# Patient Record
Sex: MALE | Race: WHITE | HISPANIC OR LATINO | Employment: UNEMPLOYED | URBAN - METROPOLITAN AREA
[De-identification: names, ages, dates, MRNs, and addresses within clinical notes are randomized per-mention and may not be internally consistent; named-entity substitution may affect disease eponyms.]

---

## 2022-11-12 ENCOUNTER — APPOINTMENT (OUTPATIENT)
Dept: RADIOLOGY | Facility: MEDICAL CENTER | Age: 47
DRG: 281 | End: 2022-11-12
Attending: EMERGENCY MEDICINE

## 2022-11-12 ENCOUNTER — APPOINTMENT (OUTPATIENT)
Dept: RADIOLOGY | Facility: MEDICAL CENTER | Age: 47
DRG: 281 | End: 2022-11-12
Attending: INTERNAL MEDICINE

## 2022-11-12 ENCOUNTER — HOSPITAL ENCOUNTER (INPATIENT)
Facility: MEDICAL CENTER | Age: 47
LOS: 6 days | DRG: 281 | End: 2022-11-18
Attending: EMERGENCY MEDICINE | Admitting: INTERNAL MEDICINE

## 2022-11-12 DIAGNOSIS — G61.0 GUILLAIN-BARRE SYNDROME (HCC): ICD-10-CM

## 2022-11-12 PROBLEM — I21.4 NSTEMI (NON-ST ELEVATED MYOCARDIAL INFARCTION) (HCC): Status: ACTIVE | Noted: 2022-11-12

## 2022-11-12 LAB
ALBUMIN SERPL BCP-MCNC: 5.1 G/DL (ref 3.2–4.9)
ALBUMIN/GLOB SERPL: 2 G/DL
ALP SERPL-CCNC: 143 U/L (ref 30–99)
ALT SERPL-CCNC: 80 U/L (ref 2–50)
ANION GAP SERPL CALC-SCNC: 13 MMOL/L (ref 7–16)
APTT PPP: 30.2 SEC (ref 24.7–36)
AST SERPL-CCNC: 61 U/L (ref 12–45)
BASOPHILS # BLD AUTO: 0.2 % (ref 0–1.8)
BASOPHILS # BLD: 0.01 K/UL (ref 0–0.12)
BILIRUB SERPL-MCNC: 0.3 MG/DL (ref 0.1–1.5)
BUN SERPL-MCNC: 23 MG/DL (ref 8–22)
CALCIUM SERPL-MCNC: 9.2 MG/DL (ref 8.5–10.5)
CHLORIDE SERPL-SCNC: 100 MMOL/L (ref 96–112)
CO2 SERPL-SCNC: 25 MMOL/L (ref 20–33)
CREAT SERPL-MCNC: 0.95 MG/DL (ref 0.5–1.4)
D DIMER PPP IA.FEU-MCNC: 0.65 UG/ML (FEU) (ref 0–0.5)
EKG IMPRESSION: NORMAL
EOSINOPHIL # BLD AUTO: 0.01 K/UL (ref 0–0.51)
EOSINOPHIL NFR BLD: 0.2 % (ref 0–6.9)
ERYTHROCYTE [DISTWIDTH] IN BLOOD BY AUTOMATED COUNT: 51.3 FL (ref 35.9–50)
EST. AVERAGE GLUCOSE BLD GHB EST-MCNC: 111 MG/DL
GFR SERPLBLD CREATININE-BSD FMLA CKD-EPI: 99 ML/MIN/1.73 M 2
GLOBULIN SER CALC-MCNC: 2.6 G/DL (ref 1.9–3.5)
GLUCOSE SERPL-MCNC: 102 MG/DL (ref 65–99)
HBA1C MFR BLD: 5.5 % (ref 4–5.6)
HCT VFR BLD AUTO: 40 % (ref 42–52)
HGB BLD-MCNC: 13.7 G/DL (ref 14–18)
IMM GRANULOCYTES # BLD AUTO: 0.01 K/UL (ref 0–0.11)
IMM GRANULOCYTES NFR BLD AUTO: 0.2 % (ref 0–0.9)
INR PPP: 1.01 (ref 0.87–1.13)
LYMPHOCYTES # BLD AUTO: 0.68 K/UL (ref 1–4.8)
LYMPHOCYTES NFR BLD: 13.4 % (ref 22–41)
MCH RBC QN AUTO: 36 PG (ref 27–33)
MCHC RBC AUTO-ENTMCNC: 34.3 G/DL (ref 33.7–35.3)
MCV RBC AUTO: 105 FL (ref 81.4–97.8)
MONOCYTES # BLD AUTO: 0.31 K/UL (ref 0–0.85)
MONOCYTES NFR BLD AUTO: 6.1 % (ref 0–13.4)
NEUTROPHILS # BLD AUTO: 4.04 K/UL (ref 1.82–7.42)
NEUTROPHILS NFR BLD: 79.9 % (ref 44–72)
NRBC # BLD AUTO: 0 K/UL
NRBC BLD-RTO: 0 /100 WBC
PLATELET # BLD AUTO: 127 K/UL (ref 164–446)
PMV BLD AUTO: 10.3 FL (ref 9–12.9)
POTASSIUM SERPL-SCNC: 3.8 MMOL/L (ref 3.6–5.5)
PROT SERPL-MCNC: 7.7 G/DL (ref 6–8.2)
PROTHROMBIN TIME: 13.2 SEC (ref 12–14.6)
RBC # BLD AUTO: 3.81 M/UL (ref 4.7–6.1)
SODIUM SERPL-SCNC: 138 MMOL/L (ref 135–145)
T4 FREE SERPL-MCNC: 1.16 NG/DL (ref 0.93–1.7)
TROPONIN T SERPL-MCNC: 54 NG/L (ref 6–19)
TROPONIN T SERPL-MCNC: 57 NG/L (ref 6–19)
TROPONIN T SERPL-MCNC: 57 NG/L (ref 6–19)
TSH SERPL DL<=0.005 MIU/L-ACNC: 13.9 UIU/ML (ref 0.38–5.33)
VIT B12 SERPL-MCNC: 369 PG/ML (ref 211–911)
WBC # BLD AUTO: 5.1 K/UL (ref 4.8–10.8)

## 2022-11-12 PROCEDURE — 700111 HCHG RX REV CODE 636 W/ 250 OVERRIDE (IP): Performed by: INTERNAL MEDICINE

## 2022-11-12 PROCEDURE — 76705 ECHO EXAM OF ABDOMEN: CPT

## 2022-11-12 PROCEDURE — 99285 EMERGENCY DEPT VISIT HI MDM: CPT

## 2022-11-12 PROCEDURE — 700111 HCHG RX REV CODE 636 W/ 250 OVERRIDE (IP): Performed by: EMERGENCY MEDICINE

## 2022-11-12 PROCEDURE — A9270 NON-COVERED ITEM OR SERVICE: HCPCS | Performed by: INTERNAL MEDICINE

## 2022-11-12 PROCEDURE — 96376 TX/PRO/DX INJ SAME DRUG ADON: CPT

## 2022-11-12 PROCEDURE — 700102 HCHG RX REV CODE 250 W/ 637 OVERRIDE(OP): Performed by: INTERNAL MEDICINE

## 2022-11-12 PROCEDURE — 84484 ASSAY OF TROPONIN QUANT: CPT

## 2022-11-12 PROCEDURE — 96374 THER/PROPH/DIAG INJ IV PUSH: CPT

## 2022-11-12 PROCEDURE — 84439 ASSAY OF FREE THYROXINE: CPT

## 2022-11-12 PROCEDURE — 84443 ASSAY THYROID STIM HORMONE: CPT

## 2022-11-12 PROCEDURE — 85730 THROMBOPLASTIN TIME PARTIAL: CPT

## 2022-11-12 PROCEDURE — 770020 HCHG ROOM/CARE - TELE (206)

## 2022-11-12 PROCEDURE — 85610 PROTHROMBIN TIME: CPT

## 2022-11-12 PROCEDURE — 85025 COMPLETE CBC W/AUTO DIFF WBC: CPT

## 2022-11-12 PROCEDURE — 93005 ELECTROCARDIOGRAM TRACING: CPT | Performed by: EMERGENCY MEDICINE

## 2022-11-12 PROCEDURE — 80053 COMPREHEN METABOLIC PANEL: CPT

## 2022-11-12 PROCEDURE — 82607 VITAMIN B-12: CPT

## 2022-11-12 PROCEDURE — 36415 COLL VENOUS BLD VENIPUNCTURE: CPT

## 2022-11-12 PROCEDURE — 99223 1ST HOSP IP/OBS HIGH 75: CPT | Performed by: INTERNAL MEDICINE

## 2022-11-12 PROCEDURE — 85379 FIBRIN DEGRADATION QUANT: CPT

## 2022-11-12 PROCEDURE — 71045 X-RAY EXAM CHEST 1 VIEW: CPT

## 2022-11-12 PROCEDURE — 83036 HEMOGLOBIN GLYCOSYLATED A1C: CPT

## 2022-11-12 RX ORDER — CLOPIDOGREL BISULFATE 75 MG/1
75 TABLET ORAL DAILY
Status: DISCONTINUED | OUTPATIENT
Start: 2022-11-13 | End: 2022-11-18 | Stop reason: HOSPADM

## 2022-11-12 RX ORDER — ATORVASTATIN CALCIUM 40 MG/1
40 TABLET, FILM COATED ORAL EVERY EVENING
Status: DISCONTINUED | OUTPATIENT
Start: 2022-11-12 | End: 2022-11-15

## 2022-11-12 RX ORDER — AMOXICILLIN 250 MG
2 CAPSULE ORAL 2 TIMES DAILY
Status: DISCONTINUED | OUTPATIENT
Start: 2022-11-12 | End: 2022-11-18 | Stop reason: HOSPADM

## 2022-11-12 RX ORDER — LEVOTHYROXINE SODIUM 0.12 MG/1
125 TABLET ORAL
Status: DISCONTINUED | OUTPATIENT
Start: 2022-11-13 | End: 2022-11-18 | Stop reason: HOSPADM

## 2022-11-12 RX ORDER — MORPHINE SULFATE 4 MG/ML
4 INJECTION INTRAVENOUS ONCE
Status: COMPLETED | OUTPATIENT
Start: 2022-11-12 | End: 2022-11-12

## 2022-11-12 RX ORDER — ONDANSETRON 2 MG/ML
4 INJECTION INTRAMUSCULAR; INTRAVENOUS EVERY 4 HOURS PRN
Status: DISCONTINUED | OUTPATIENT
Start: 2022-11-12 | End: 2022-11-18 | Stop reason: HOSPADM

## 2022-11-12 RX ORDER — ENOXAPARIN SODIUM 100 MG/ML
60 INJECTION SUBCUTANEOUS EVERY 12 HOURS
Status: DISCONTINUED | OUTPATIENT
Start: 2022-11-12 | End: 2022-11-13

## 2022-11-12 RX ORDER — BISACODYL 10 MG
10 SUPPOSITORY, RECTAL RECTAL
Status: DISCONTINUED | OUTPATIENT
Start: 2022-11-12 | End: 2022-11-18 | Stop reason: HOSPADM

## 2022-11-12 RX ORDER — FUROSEMIDE 10 MG/ML
20 INJECTION INTRAMUSCULAR; INTRAVENOUS ONCE
Status: COMPLETED | OUTPATIENT
Start: 2022-11-12 | End: 2022-11-12

## 2022-11-12 RX ORDER — PROMETHAZINE HYDROCHLORIDE 25 MG/1
12.5-25 SUPPOSITORY RECTAL EVERY 4 HOURS PRN
Status: DISCONTINUED | OUTPATIENT
Start: 2022-11-12 | End: 2022-11-18 | Stop reason: HOSPADM

## 2022-11-12 RX ORDER — PROCHLORPERAZINE EDISYLATE 5 MG/ML
5-10 INJECTION INTRAMUSCULAR; INTRAVENOUS EVERY 4 HOURS PRN
Status: DISCONTINUED | OUTPATIENT
Start: 2022-11-12 | End: 2022-11-18 | Stop reason: HOSPADM

## 2022-11-12 RX ORDER — ONDANSETRON 4 MG/1
4 TABLET, ORALLY DISINTEGRATING ORAL EVERY 4 HOURS PRN
Status: DISCONTINUED | OUTPATIENT
Start: 2022-11-12 | End: 2022-11-18 | Stop reason: HOSPADM

## 2022-11-12 RX ORDER — PROMETHAZINE HYDROCHLORIDE 25 MG/1
12.5-25 TABLET ORAL EVERY 4 HOURS PRN
Status: DISCONTINUED | OUTPATIENT
Start: 2022-11-12 | End: 2022-11-18 | Stop reason: HOSPADM

## 2022-11-12 RX ORDER — POLYETHYLENE GLYCOL 3350 17 G/17G
1 POWDER, FOR SOLUTION ORAL
Status: DISCONTINUED | OUTPATIENT
Start: 2022-11-12 | End: 2022-11-18 | Stop reason: HOSPADM

## 2022-11-12 RX ORDER — LISINOPRIL 5 MG/1
5 TABLET ORAL
Status: DISCONTINUED | OUTPATIENT
Start: 2022-11-13 | End: 2022-11-13

## 2022-11-12 RX ADMIN — MORPHINE SULFATE 4 MG: 4 INJECTION INTRAVENOUS at 20:26

## 2022-11-12 RX ADMIN — SENNOSIDES AND DOCUSATE SODIUM 2 TABLET: 50; 8.6 TABLET ORAL at 23:22

## 2022-11-12 RX ADMIN — FUROSEMIDE 20 MG: 10 INJECTION, SOLUTION INTRAMUSCULAR; INTRAVENOUS at 23:23

## 2022-11-12 RX ADMIN — MORPHINE SULFATE 4 MG: 4 INJECTION INTRAVENOUS at 16:53

## 2022-11-12 RX ADMIN — ATORVASTATIN CALCIUM 40 MG: 40 TABLET, FILM COATED ORAL at 23:23

## 2022-11-12 RX ADMIN — ENOXAPARIN SODIUM 60 MG: 60 INJECTION SUBCUTANEOUS at 23:22

## 2022-11-12 RX ADMIN — METOPROLOL TARTRATE 12.5 MG: 25 TABLET, FILM COATED ORAL at 23:22

## 2022-11-12 ASSESSMENT — ENCOUNTER SYMPTOMS
PHOTOPHOBIA: 0
SPEECH CHANGE: 0
CLAUDICATION: 0
COUGH: 0
MYALGIAS: 0
NAUSEA: 0
DOUBLE VISION: 0
DIARRHEA: 0
ORTHOPNEA: 0
PALPITATIONS: 0
NECK PAIN: 0
WEIGHT LOSS: 0
VOMITING: 0
DIZZINESS: 0
HEMOPTYSIS: 0
BLURRED VISION: 0
CONSTIPATION: 0
CHILLS: 0
ABDOMINAL PAIN: 0
FEVER: 0
WEAKNESS: 0

## 2022-11-12 ASSESSMENT — LIFESTYLE VARIABLES: DO YOU DRINK ALCOHOL: NO

## 2022-11-12 NOTE — ED PROVIDER NOTES
ED Provider Note    CHIEF COMPLAINT  Chief Complaint   Patient presents with    Chest Pain     X 2 hours. Came in by ems. EMS gave 324 asa pta       HPI  Kristopher Leroy is a 46 y.o. male who presents with 2 hours of chest pain starting while he was walking with his walker.  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  The patient has a rather complex medical history as well including 3 open heart surgeries and multiple valvular replacements done at [facility].  He also notes that he has had prior history of cardiac catheterization though he is uncertain if any procedures were performed during catheterization such as stenting.  He states that he was taking a train to [facility] where he is residing now from [place].  He has stopped by in [place] since yesterday.  Notes that while he was walking with his walker, he had sudden chest pain.  No vomiting or diaphoresis.  Has mild shortness of breath.  No recent fevers or illness.    Incidentally, he is also requesting a new walker as his is no longer functioning appropriately.    He is on chronic medications for his multiple heart conditions.  He states that he has been instructed to change his doses however he has not yet given that he is unable to read.    REVIEW OF SYSTEMS  See HPI for further details. All other systems are negative.     PAST MEDICAL HISTORY       SOCIAL HISTORY  Social History     Tobacco Use    Smoking status: Not on file    Smokeless tobacco: Not on file   Substance and Sexual Activity    Alcohol use: Not on file    Drug use: Not on file    Sexual activity: Not on file       SURGICAL HISTORY  patient denies any surgical history    CURRENT MEDICATIONS  Home Medications    **Home medications have not yet been reviewed for this encounter**         ALLERGIES  No Known Allergies    PHYSICAL EXAM  VITAL SIGNS: BP (!) 143/85   Pulse 84   Resp 18   Ht 1.651 m (5'  "5\")   Wt 72.6 kg (160 lb)   SpO2 100%   BMI 26.63 kg/m²   Pulse ox interpretation: I interpret this pulse ox as normal.  Constitutional: Alert in no apparent distress.  HENT: No signs of trauma, Bilateral external ears normal, Nose normal.   Eyes: Pupils are equal and reactive, Conjunctiva normal, Non-icteric.   Neck: Normal range of motion, Supple, No stridor.   Cardiovascular: Regular rate and rhythm.   Thorax & Lungs: Normal breath sounds, No respiratory distress, No wheezing, No chest tenderness.   Abdomen: Soft, No tenderness, No masses, No pulsatile masses. No peritoneal signs.  Skin: Warm, Dry, No erythema, No rash.   Back: No bony tenderness, No CVA tenderness.   Extremities: Intact distal pulses, No edema, No tenderness, No cyanosis  Musculoskeletal:  No major deformities noted.   Neurologic: Alert, chronic extremity weakness and contractures to bilateral hands no focal deficits noted.       DIAGNOSTIC STUDIES / PROCEDURES    EKG - Physician interpretation  Results for orders placed or performed during the hospital encounter of 22   EKG   Result Value Ref Range    Report       [facility] Emergency Dept.    Test Date:  2022  Pt Name:    KATTY HOLLAND           Department: ER  MRN:        [id number]                      Room:        14  Gender:     Male                         Technician: [id number]  :        1975                   Requested By:CHARITY POSADA  Order #:    [id number]                    Reading MD:    Measurements  Intervals                                Axis  Rate:       77                           P:          44  NH:         166                          QRS:        112  QRSD:       109                          T:          -19  QT:         403  QTc:        457    Interpretive Statements  Sinus rhythm  Ventricular premature complex  Probable left atrial enlargement  Left posterior fascicular block  Nonspecific T abnormalities, lateral leads  Borderline " ST elevation, anterior leads  No previous ECG available for comparison     EKG   Result Value Ref Range    Report       [facility] Emergency Dept.    Test Date:  2022  Pt Name:    KATTY HOLLAND           Department: ER  MRN:        [id number]                      Room:       BL 14  Gender:     Male                         Technician: [id number]  :        1975                   Requested By:CHARITY POSADA  Order #:    [id number]                    Reading MD:    Measurements  Intervals                                Axis  Rate:       81                           P:          10  MO:         229                          QRS:        104  QRSD:       113                          T:          -40  QT:         393  QTc:        457    Interpretive Statements  Sinus rhythm  Prolonged MO interval  Probable left atrial enlargement  Borderline intraventricular conduction delay  Nonspecific T abnormalities, lateral leads  Compared to ECG 2022 16:05:01  First degree AV block now present  Ventricular premature complex(es) no longer present  Left posterior fascicular block no longer pre sent  ST (T wave) deviation no longer present  T-wave abnormality still present           LABS  Labs Reviewed   CBC WITH DIFFERENTIAL - Abnormal; Notable for the following components:       Result Value    RBC 3.81 (*)     Hemoglobin 13.7 (*)     Hematocrit 40.0 (*)     .0 (*)     MCH 36.0 (*)     RDW 51.3 (*)     Platelet Count 127 (*)     Neutrophils-Polys 79.90 (*)     Lymphocytes 13.40 (*)     Lymphs (Absolute) 0.68 (*)     All other components within normal limits    Narrative:     Biotin intake of greater than 5 mg per day may interfere with  troponin levels, causing false low values.   COMP METABOLIC PANEL - Abnormal; Notable for the following components:    Glucose 102 (*)     Bun 23 (*)     AST(SGOT) 61 (*)     ALT(SGPT) 80 (*)     Alkaline Phosphatase 143 (*)     Albumin 5.1 (*)     All other  components within normal limits    Narrative:     Biotin intake of greater than 5 mg per day may interfere with  troponin levels, causing false low values.   TROPONIN - Abnormal; Notable for the following components:    Troponin T 57 (*)     All other components within normal limits    Narrative:     Biotin intake of greater than 5 mg per day may interfere with  troponin levels, causing false low values.   TROPONIN - Abnormal; Notable for the following components:    Troponin T 54 (*)     All other components within normal limits    Narrative:     Biotin intake of greater than 5 mg per day may interfere with  troponin levels, causing false low values.   TSH WITH REFLEX TO FT4 - Abnormal; Notable for the following components:    TSH 13.900 (*)     All other components within normal limits   D-DIMER - Abnormal; Notable for the following components:    D-Dimer Screen 0.65 (*)     All other components within normal limits   TROPONIN - Abnormal; Notable for the following components:    Troponin T 57 (*)     All other components within normal limits    Narrative:     Biotin intake of greater than 5 mg per day may interfere with  troponin levels, causing false low values.   PROTHROMBIN TIME    Narrative:     Biotin intake of greater than 5 mg per day may interfere with  troponin levels, causing false low values.   APTT    Narrative:     Biotin intake of greater than 5 mg per day may interfere with  troponin levels, causing false low values.   ESTIMATED GFR    Narrative:     Biotin intake of greater than 5 mg per day may interfere with  troponin levels, causing false low values.   VITAMIN B12   HEMOGLOBIN A1C   FREE THYROXINE   CBC WITH DIFFERENTIAL   COMP METABOLIC PANEL   MAGNESIUM   LIPID PROFILE   HEPATITIS PANEL ACUTE(4 COMPONENTS)   TROPONIN   TROPONIN         RADIOLOGY  US-RUQ   Final Result      1. Status post cholecystectomy. The common bile duct is obscured by overlying bowel gas.   2. Homogeneous appearance of  the liver with no hepatic mass.      DX-CHEST-PORTABLE (1 VIEW)   Final Result      1.  Mildly enlarged cardiac silhouette with vascular congestion.      EC-ECHOCARDIOGRAM COMPLETE W/O CONT    (Results Pending)   CT-CTA CHEST PULMONARY ARTERY W/ RECONS    (Results Pending)         COURSE & MEDICAL DECISION MAKING    Medications   senna-docusate (PERICOLACE or SENOKOT S) 8.6-50 MG per tablet 2 Tablet (2 Tablets Oral Given 11/12/22 2322)     And   polyethylene glycol/lytes (MIRALAX) PACKET 1 Packet (has no administration in time range)     And   magnesium hydroxide (MILK OF MAGNESIA) suspension 30 mL (has no administration in time range)     And   bisacodyl (DULCOLAX) suppository 10 mg (has no administration in time range)   enoxaparin (Lovenox) inj 60 mg (60 mg Subcutaneous Given 11/12/22 2322)   clopidogrel (PLAVIX) tablet 75 mg (has no administration in time range)   metoprolol tartrate (LOPRESSOR) tablet 12.5 mg (12.5 mg Oral Given 11/12/22 2322)   lisinopril (PRINIVIL) tablet 5 mg (has no administration in time range)   ondansetron (ZOFRAN) syringe/vial injection 4 mg (has no administration in time range)   ondansetron (ZOFRAN ODT) dispertab 4 mg (has no administration in time range)   promethazine (PHENERGAN) tablet 12.5-25 mg (has no administration in time range)   promethazine (PHENERGAN) suppository 12.5-25 mg (has no administration in time range)   prochlorperazine (COMPAZINE) injection 5-10 mg (has no administration in time range)   atorvastatin (LIPITOR) tablet 40 mg (40 mg Oral Given 11/12/22 2323)   levothyroxine (SYNTHROID) tablet 125 mcg (has no administration in time range)   morphine 4 MG/ML injection 4 mg (4 mg Intravenous Given 11/12/22 1653)   morphine 4 MG/ML injection 4 mg (4 mg Intravenous Given 11/12/22 2026)   furosemide (LASIX) injection 20 mg (20 mg Intravenous Given 11/12/22 2323)       Pertinent Labs & Imaging studies reviewed. (See chart for details)  46 y.o. male presenting with diffuse  "chest pain that started about 2 hours prior to arrival.  He states that he is currently on his way to [place] from [facility].  He has longstanding cardiac issues though is uncertain which ones.  Incidentally, he also has a history of Guillain-Barré with chronic extremity weakness.  He ambulates with a walker -he states that his walker is currently broken and he would like another 1.  He states that all of his care has been in [facility].  He has had multiple open heart surgeries and cardiac catheterizations without a clear understanding of his diagnoses.  We did attempt several times to obtain medical records from [place].  Unfortunately, there were technical difficulties with regards to faxing medical records requests to and from [facility].  Staff here had been in contact with [place] throughout the day and tried several times to obtain medical records without success.      Patient does have abnormal EKG with lateral T wave inversions and slight elevation in troponin.  Continues to have chest pain.  Given the unclear underlying cardiac issues that the patient has, recommending hospitalization for further work-up and stabilization while continuing to try to attempt to obtain outside medical records.  Patient states that he has had similar chest pain symptoms in the past that did not require any invasive interventions though he is uncertain why he had chest pain in the past.  Overall, the patient is a rather poor medical historian and the patient's underlying medical issues are unknown at this point.    I did speak with the hospitalist service and they are agreeable to the hospitalization.    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

## 2022-11-13 ENCOUNTER — APPOINTMENT (OUTPATIENT)
Dept: CARDIOLOGY | Facility: MEDICAL CENTER | Age: 47
DRG: 281 | End: 2022-11-13
Attending: STUDENT IN AN ORGANIZED HEALTH CARE EDUCATION/TRAINING PROGRAM

## 2022-11-13 ENCOUNTER — APPOINTMENT (OUTPATIENT)
Dept: RADIOLOGY | Facility: MEDICAL CENTER | Age: 47
DRG: 281 | End: 2022-11-13
Attending: INTERNAL MEDICINE

## 2022-11-13 ENCOUNTER — APPOINTMENT (OUTPATIENT)
Dept: CARDIOLOGY | Facility: MEDICAL CENTER | Age: 47
DRG: 281 | End: 2022-11-13
Attending: INTERNAL MEDICINE

## 2022-11-13 PROBLEM — D53.9 MACROCYTIC ANEMIA: Status: ACTIVE | Noted: 2022-11-13

## 2022-11-13 PROBLEM — R07.9 CHEST PAIN: Status: ACTIVE | Noted: 2022-11-13

## 2022-11-13 PROBLEM — F10.10 ALCOHOL CONSUMPTION BINGE DRINKING: Status: ACTIVE | Noted: 2022-11-13

## 2022-11-13 LAB
ALBUMIN SERPL BCP-MCNC: 4.3 G/DL (ref 3.2–4.9)
ALBUMIN SERPL BCP-MCNC: 4.5 G/DL (ref 3.2–4.9)
ALBUMIN/GLOB SERPL: 1.9 G/DL
ALBUMIN/GLOB SERPL: 2 G/DL
ALP SERPL-CCNC: 108 U/L (ref 30–99)
ALP SERPL-CCNC: 116 U/L (ref 30–99)
ALT SERPL-CCNC: 63 U/L (ref 2–50)
ALT SERPL-CCNC: 64 U/L (ref 2–50)
AMPHET UR QL SCN: NEGATIVE
ANION GAP SERPL CALC-SCNC: 10 MMOL/L (ref 7–16)
ANION GAP SERPL CALC-SCNC: 12 MMOL/L (ref 7–16)
APTT PPP: 42.5 SEC (ref 24.7–36)
AST SERPL-CCNC: 47 U/L (ref 12–45)
AST SERPL-CCNC: 49 U/L (ref 12–45)
BARBITURATES UR QL SCN: NEGATIVE
BASOPHILS # BLD AUTO: 0.2 % (ref 0–1.8)
BASOPHILS # BLD: 0.01 K/UL (ref 0–0.12)
BENZODIAZ UR QL SCN: POSITIVE
BILIRUB SERPL-MCNC: 0.3 MG/DL (ref 0.1–1.5)
BILIRUB SERPL-MCNC: 0.5 MG/DL (ref 0.1–1.5)
BUN SERPL-MCNC: 20 MG/DL (ref 8–22)
BUN SERPL-MCNC: 21 MG/DL (ref 8–22)
BZE UR QL SCN: NEGATIVE
CALCIUM SERPL-MCNC: 8.6 MG/DL (ref 8.5–10.5)
CALCIUM SERPL-MCNC: 8.6 MG/DL (ref 8.5–10.5)
CANNABINOIDS UR QL SCN: POSITIVE
CHLORIDE SERPL-SCNC: 100 MMOL/L (ref 96–112)
CHLORIDE SERPL-SCNC: 102 MMOL/L (ref 96–112)
CHOLEST SERPL-MCNC: 114 MG/DL (ref 100–199)
CO2 SERPL-SCNC: 25 MMOL/L (ref 20–33)
CO2 SERPL-SCNC: 26 MMOL/L (ref 20–33)
CREAT SERPL-MCNC: 0.57 MG/DL (ref 0.5–1.4)
CREAT SERPL-MCNC: 0.75 MG/DL (ref 0.5–1.4)
CRP SERPL HS-MCNC: <0.3 MG/DL (ref 0–0.75)
EKG IMPRESSION: NORMAL
EKG IMPRESSION: NORMAL
EOSINOPHIL # BLD AUTO: 0.01 K/UL (ref 0–0.51)
EOSINOPHIL NFR BLD: 0.2 % (ref 0–6.9)
ERYTHROCYTE [DISTWIDTH] IN BLOOD BY AUTOMATED COUNT: 51.4 FL (ref 35.9–50)
ERYTHROCYTE [DISTWIDTH] IN BLOOD BY AUTOMATED COUNT: 51.6 FL (ref 35.9–50)
ERYTHROCYTE [SEDIMENTATION RATE] IN BLOOD BY WESTERGREN METHOD: 6 MM/HOUR (ref 0–20)
GFR SERPLBLD CREATININE-BSD FMLA CKD-EPI: 111 ML/MIN/1.73 M 2
GFR SERPLBLD CREATININE-BSD FMLA CKD-EPI: 121 ML/MIN/1.73 M 2
GLOBULIN SER CALC-MCNC: 2.3 G/DL (ref 1.9–3.5)
GLOBULIN SER CALC-MCNC: 2.3 G/DL (ref 1.9–3.5)
GLUCOSE SERPL-MCNC: 104 MG/DL (ref 65–99)
GLUCOSE SERPL-MCNC: 134 MG/DL (ref 65–99)
HAV IGM SERPL QL IA: NORMAL
HBV CORE IGM SER QL: NORMAL
HBV SURFACE AG SER QL: NORMAL
HCT VFR BLD AUTO: 36.7 % (ref 42–52)
HCT VFR BLD AUTO: 38.2 % (ref 42–52)
HCV AB SER QL: NORMAL
HDLC SERPL-MCNC: 57 MG/DL
HGB BLD-MCNC: 12.4 G/DL (ref 14–18)
HGB BLD-MCNC: 12.9 G/DL (ref 14–18)
IMM GRANULOCYTES # BLD AUTO: 0.02 K/UL (ref 0–0.11)
IMM GRANULOCYTES NFR BLD AUTO: 0.5 % (ref 0–0.9)
INR PPP: 1.1 (ref 0.87–1.13)
LDLC SERPL CALC-MCNC: 51 MG/DL
LIPASE SERPL-CCNC: 46 U/L (ref 11–82)
LV EJECT FRACT  99904: 20
LV EJECT FRACT  99904: 20
LV EJECT FRACT MOD 2C 99903: 28.8
LV EJECT FRACT MOD 2C 99903: 28.8
LYMPHOCYTES # BLD AUTO: 0.8 K/UL (ref 1–4.8)
LYMPHOCYTES NFR BLD: 19.7 % (ref 22–41)
MAGNESIUM SERPL-MCNC: 1.9 MG/DL (ref 1.5–2.5)
MCH RBC QN AUTO: 35.2 PG (ref 27–33)
MCH RBC QN AUTO: 35.2 PG (ref 27–33)
MCHC RBC AUTO-ENTMCNC: 33.8 G/DL (ref 33.7–35.3)
MCHC RBC AUTO-ENTMCNC: 33.8 G/DL (ref 33.7–35.3)
MCV RBC AUTO: 104.3 FL (ref 81.4–97.8)
MCV RBC AUTO: 104.4 FL (ref 81.4–97.8)
METHADONE UR QL SCN: NEGATIVE
MONOCYTES # BLD AUTO: 0.31 K/UL (ref 0–0.85)
MONOCYTES NFR BLD AUTO: 7.6 % (ref 0–13.4)
NEUTROPHILS # BLD AUTO: 2.92 K/UL (ref 1.82–7.42)
NEUTROPHILS NFR BLD: 71.8 % (ref 44–72)
NRBC # BLD AUTO: 0 K/UL
NRBC BLD-RTO: 0 /100 WBC
OPIATES UR QL SCN: POSITIVE
OXYCODONE UR QL SCN: NEGATIVE
PCP UR QL SCN: NEGATIVE
PLATELET # BLD AUTO: 111 K/UL (ref 164–446)
PLATELET # BLD AUTO: 115 K/UL (ref 164–446)
PMV BLD AUTO: 10.3 FL (ref 9–12.9)
PMV BLD AUTO: 10.9 FL (ref 9–12.9)
POTASSIUM SERPL-SCNC: 3.8 MMOL/L (ref 3.6–5.5)
POTASSIUM SERPL-SCNC: 3.9 MMOL/L (ref 3.6–5.5)
PROPOXYPH UR QL SCN: NEGATIVE
PROT SERPL-MCNC: 6.6 G/DL (ref 6–8.2)
PROT SERPL-MCNC: 6.8 G/DL (ref 6–8.2)
PROTHROMBIN TIME: 14.1 SEC (ref 12–14.6)
RBC # BLD AUTO: 3.52 M/UL (ref 4.7–6.1)
RBC # BLD AUTO: 3.66 M/UL (ref 4.7–6.1)
SODIUM SERPL-SCNC: 137 MMOL/L (ref 135–145)
SODIUM SERPL-SCNC: 138 MMOL/L (ref 135–145)
TRIGL SERPL-MCNC: 31 MG/DL (ref 0–149)
TROPONIN T SERPL-MCNC: 57 NG/L (ref 6–19)
TROPONIN T SERPL-MCNC: 64 NG/L (ref 6–19)
UFH PPP CHRO-ACNC: 0.72 IU/ML
WBC # BLD AUTO: 4.1 K/UL (ref 4.8–10.8)
WBC # BLD AUTO: 4.7 K/UL (ref 4.8–10.8)

## 2022-11-13 PROCEDURE — 700111 HCHG RX REV CODE 636 W/ 250 OVERRIDE (IP)

## 2022-11-13 PROCEDURE — 700105 HCHG RX REV CODE 258: Performed by: STUDENT IN AN ORGANIZED HEALTH CARE EDUCATION/TRAINING PROGRAM

## 2022-11-13 PROCEDURE — 770020 HCHG ROOM/CARE - TELE (206)

## 2022-11-13 PROCEDURE — 86140 C-REACTIVE PROTEIN: CPT

## 2022-11-13 PROCEDURE — 93306 TTE W/DOPPLER COMPLETE: CPT

## 2022-11-13 PROCEDURE — A9270 NON-COVERED ITEM OR SERVICE: HCPCS | Performed by: STUDENT IN AN ORGANIZED HEALTH CARE EDUCATION/TRAINING PROGRAM

## 2022-11-13 PROCEDURE — 80053 COMPREHEN METABOLIC PANEL: CPT

## 2022-11-13 PROCEDURE — 80307 DRUG TEST PRSMV CHEM ANLYZR: CPT

## 2022-11-13 PROCEDURE — 700102 HCHG RX REV CODE 250 W/ 637 OVERRIDE(OP)

## 2022-11-13 PROCEDURE — 700102 HCHG RX REV CODE 250 W/ 637 OVERRIDE(OP): Performed by: NURSE PRACTITIONER

## 2022-11-13 PROCEDURE — A9270 NON-COVERED ITEM OR SERVICE: HCPCS

## 2022-11-13 PROCEDURE — 93010 ELECTROCARDIOGRAM REPORT: CPT | Mod: 76 | Performed by: STUDENT IN AN ORGANIZED HEALTH CARE EDUCATION/TRAINING PROGRAM

## 2022-11-13 PROCEDURE — B2151ZZ FLUOROSCOPY OF LEFT HEART USING LOW OSMOLAR CONTRAST: ICD-10-PCS | Performed by: INTERNAL MEDICINE

## 2022-11-13 PROCEDURE — 93458 L HRT ARTERY/VENTRICLE ANGIO: CPT | Mod: 26 | Performed by: INTERNAL MEDICINE

## 2022-11-13 PROCEDURE — 93306 TTE W/DOPPLER COMPLETE: CPT | Mod: 26 | Performed by: INTERNAL MEDICINE

## 2022-11-13 PROCEDURE — 36415 COLL VENOUS BLD VENIPUNCTURE: CPT

## 2022-11-13 PROCEDURE — 85652 RBC SED RATE AUTOMATED: CPT

## 2022-11-13 PROCEDURE — 93567 NJX CAR CTH SPRVLV AORTGRPHY: CPT | Performed by: INTERNAL MEDICINE

## 2022-11-13 PROCEDURE — 4A023N7 MEASUREMENT OF CARDIAC SAMPLING AND PRESSURE, LEFT HEART, PERCUTANEOUS APPROACH: ICD-10-PCS | Performed by: INTERNAL MEDICINE

## 2022-11-13 PROCEDURE — 700117 HCHG RX CONTRAST REV CODE 255: Performed by: INTERNAL MEDICINE

## 2022-11-13 PROCEDURE — 93005 ELECTROCARDIOGRAM TRACING: CPT | Performed by: INTERNAL MEDICINE

## 2022-11-13 PROCEDURE — 84484 ASSAY OF TROPONIN QUANT: CPT

## 2022-11-13 PROCEDURE — B3101ZZ FLUOROSCOPY OF THORACIC AORTA USING LOW OSMOLAR CONTRAST: ICD-10-PCS | Performed by: INTERNAL MEDICINE

## 2022-11-13 PROCEDURE — 700101 HCHG RX REV CODE 250

## 2022-11-13 PROCEDURE — 700102 HCHG RX REV CODE 250 W/ 637 OVERRIDE(OP): Performed by: INTERNAL MEDICINE

## 2022-11-13 PROCEDURE — 85520 HEPARIN ASSAY: CPT

## 2022-11-13 PROCEDURE — 85025 COMPLETE CBC W/AUTO DIFF WBC: CPT

## 2022-11-13 PROCEDURE — C9803 HOPD COVID-19 SPEC COLLECT: HCPCS

## 2022-11-13 PROCEDURE — 700111 HCHG RX REV CODE 636 W/ 250 OVERRIDE (IP): Performed by: INTERNAL MEDICINE

## 2022-11-13 PROCEDURE — 99254 IP/OBS CNSLTJ NEW/EST MOD 60: CPT | Performed by: STUDENT IN AN ORGANIZED HEALTH CARE EDUCATION/TRAINING PROGRAM

## 2022-11-13 PROCEDURE — 700105 HCHG RX REV CODE 258: Performed by: INTERNAL MEDICINE

## 2022-11-13 PROCEDURE — 83690 ASSAY OF LIPASE: CPT

## 2022-11-13 PROCEDURE — 71275 CT ANGIOGRAPHY CHEST: CPT

## 2022-11-13 PROCEDURE — 99152 MOD SED SAME PHYS/QHP 5/>YRS: CPT | Performed by: INTERNAL MEDICINE

## 2022-11-13 PROCEDURE — 99153 MOD SED SAME PHYS/QHP EA: CPT

## 2022-11-13 PROCEDURE — 700102 HCHG RX REV CODE 250 W/ 637 OVERRIDE(OP): Performed by: STUDENT IN AN ORGANIZED HEALTH CARE EDUCATION/TRAINING PROGRAM

## 2022-11-13 PROCEDURE — 99233 SBSQ HOSP IP/OBS HIGH 50: CPT | Mod: GC | Performed by: INTERNAL MEDICINE

## 2022-11-13 PROCEDURE — 85027 COMPLETE CBC AUTOMATED: CPT

## 2022-11-13 PROCEDURE — 80074 ACUTE HEPATITIS PANEL: CPT

## 2022-11-13 PROCEDURE — 80061 LIPID PANEL: CPT

## 2022-11-13 PROCEDURE — 85730 THROMBOPLASTIN TIME PARTIAL: CPT

## 2022-11-13 PROCEDURE — A9270 NON-COVERED ITEM OR SERVICE: HCPCS | Performed by: NURSE PRACTITIONER

## 2022-11-13 PROCEDURE — A9270 NON-COVERED ITEM OR SERVICE: HCPCS | Performed by: INTERNAL MEDICINE

## 2022-11-13 PROCEDURE — 81003 URINALYSIS AUTO W/O SCOPE: CPT

## 2022-11-13 PROCEDURE — 85610 PROTHROMBIN TIME: CPT

## 2022-11-13 PROCEDURE — B2111ZZ FLUOROSCOPY OF MULTIPLE CORONARY ARTERIES USING LOW OSMOLAR CONTRAST: ICD-10-PCS | Performed by: INTERNAL MEDICINE

## 2022-11-13 PROCEDURE — 83735 ASSAY OF MAGNESIUM: CPT

## 2022-11-13 RX ORDER — ASPIRIN 81 MG/1
81 TABLET, CHEWABLE ORAL DAILY
Status: DISCONTINUED | OUTPATIENT
Start: 2022-11-14 | End: 2022-11-15

## 2022-11-13 RX ORDER — FUROSEMIDE 20 MG/1
20 TABLET ORAL 2 TIMES DAILY
COMMUNITY

## 2022-11-13 RX ORDER — MIDAZOLAM HYDROCHLORIDE 1 MG/ML
INJECTION INTRAMUSCULAR; INTRAVENOUS
Status: COMPLETED
Start: 2022-11-13 | End: 2022-11-13

## 2022-11-13 RX ORDER — HEPARIN SODIUM 1000 [USP'U]/ML
60 INJECTION, SOLUTION INTRAVENOUS; SUBCUTANEOUS ONCE
Status: DISCONTINUED | OUTPATIENT
Start: 2022-11-13 | End: 2022-11-13

## 2022-11-13 RX ORDER — SODIUM CHLORIDE 9 MG/ML
INJECTION, SOLUTION INTRAVENOUS CONTINUOUS
Status: DISCONTINUED | OUTPATIENT
Start: 2022-11-13 | End: 2022-11-13

## 2022-11-13 RX ORDER — MORPHINE SULFATE 4 MG/ML
2 INJECTION INTRAVENOUS ONCE
Status: COMPLETED | OUTPATIENT
Start: 2022-11-13 | End: 2022-11-13

## 2022-11-13 RX ORDER — CLOPIDOGREL BISULFATE 75 MG/1
75 TABLET ORAL DAILY
COMMUNITY

## 2022-11-13 RX ORDER — HEPARIN SODIUM 1000 [USP'U]/ML
30 INJECTION, SOLUTION INTRAVENOUS; SUBCUTANEOUS PRN
Status: DISCONTINUED | OUTPATIENT
Start: 2022-11-13 | End: 2022-11-13

## 2022-11-13 RX ORDER — TRAMADOL HYDROCHLORIDE 50 MG/1
50 TABLET ORAL EVERY 6 HOURS PRN
Status: DISCONTINUED | OUTPATIENT
Start: 2022-11-13 | End: 2022-11-18 | Stop reason: HOSPADM

## 2022-11-13 RX ORDER — ENOXAPARIN SODIUM 100 MG/ML
60 INJECTION SUBCUTANEOUS EVERY 12 HOURS
Status: DISCONTINUED | OUTPATIENT
Start: 2022-11-14 | End: 2022-11-13

## 2022-11-13 RX ORDER — HEPARIN SODIUM 5000 [USP'U]/100ML
0-30 INJECTION, SOLUTION INTRAVENOUS CONTINUOUS
Status: DISCONTINUED | OUTPATIENT
Start: 2022-11-13 | End: 2022-11-13

## 2022-11-13 RX ORDER — ASPIRIN 81 MG/1
81 TABLET, CHEWABLE ORAL DAILY
Status: DISCONTINUED | OUTPATIENT
Start: 2022-11-14 | End: 2022-11-13

## 2022-11-13 RX ORDER — METOPROLOL SUCCINATE 25 MG/1
12.5 TABLET, EXTENDED RELEASE ORAL DAILY
Status: DISCONTINUED | OUTPATIENT
Start: 2022-11-14 | End: 2022-11-13

## 2022-11-13 RX ORDER — LEVOTHYROXINE SODIUM 0.12 MG/1
125 TABLET ORAL
COMMUNITY

## 2022-11-13 RX ORDER — LISINOPRIL 2.5 MG/1
2.5 TABLET ORAL DAILY
Status: ON HOLD | COMMUNITY
End: 2022-11-18

## 2022-11-13 RX ORDER — ATORVASTATIN CALCIUM 80 MG/1
80 TABLET, FILM COATED ORAL NIGHTLY
COMMUNITY

## 2022-11-13 RX ORDER — ENOXAPARIN SODIUM 100 MG/ML
40 INJECTION SUBCUTANEOUS DAILY
Status: DISCONTINUED | OUTPATIENT
Start: 2022-11-14 | End: 2022-11-14

## 2022-11-13 RX ORDER — ACETAMINOPHEN 325 MG/1
650 TABLET ORAL EVERY 4 HOURS PRN
Status: DISCONTINUED | OUTPATIENT
Start: 2022-11-13 | End: 2022-11-13

## 2022-11-13 RX ORDER — VERAPAMIL HYDROCHLORIDE 2.5 MG/ML
INJECTION, SOLUTION INTRAVENOUS
Status: COMPLETED
Start: 2022-11-13 | End: 2022-11-13

## 2022-11-13 RX ORDER — FUROSEMIDE 20 MG/1
20 TABLET ORAL
Status: DISCONTINUED | OUTPATIENT
Start: 2022-11-13 | End: 2022-11-18 | Stop reason: HOSPADM

## 2022-11-13 RX ORDER — NITROGLYCERIN 20 MG/100ML
5 INJECTION INTRAVENOUS CONTINUOUS
Status: DISCONTINUED | OUTPATIENT
Start: 2022-11-13 | End: 2022-11-13

## 2022-11-13 RX ORDER — CHOLECALCIFEROL (VITAMIN D3) 125 MCG
5-10 CAPSULE ORAL NIGHTLY
Status: DISCONTINUED | OUTPATIENT
Start: 2022-11-13 | End: 2022-11-18 | Stop reason: HOSPADM

## 2022-11-13 RX ORDER — METOPROLOL SUCCINATE 25 MG/1
12.5 TABLET, EXTENDED RELEASE ORAL DAILY
Status: ON HOLD | COMMUNITY
End: 2022-11-18

## 2022-11-13 RX ORDER — ACETAMINOPHEN 325 MG/1
325 TABLET ORAL EVERY 6 HOURS PRN
Status: DISCONTINUED | OUTPATIENT
Start: 2022-11-13 | End: 2022-11-18 | Stop reason: HOSPADM

## 2022-11-13 RX ORDER — LIDOCAINE HYDROCHLORIDE 20 MG/ML
INJECTION, SOLUTION INFILTRATION; PERINEURAL
Status: COMPLETED
Start: 2022-11-13 | End: 2022-11-13

## 2022-11-13 RX ORDER — LISINOPRIL 5 MG/1
5 TABLET ORAL
Status: DISCONTINUED | OUTPATIENT
Start: 2022-11-14 | End: 2022-11-18 | Stop reason: HOSPADM

## 2022-11-13 RX ORDER — ASPIRIN 81 MG/1
TABLET, CHEWABLE ORAL
Status: COMPLETED
Start: 2022-11-13 | End: 2022-11-13

## 2022-11-13 RX ORDER — DIPHENHYDRAMINE HYDROCHLORIDE 50 MG/ML
INJECTION INTRAMUSCULAR; INTRAVENOUS
Status: COMPLETED
Start: 2022-11-13 | End: 2022-11-13

## 2022-11-13 RX ORDER — NITROGLYCERIN 0.1MG/HR
1 PATCH, TRANSDERMAL 24 HOURS TRANSDERMAL DAILY
Status: DISCONTINUED | OUTPATIENT
Start: 2022-11-13 | End: 2022-11-13

## 2022-11-13 RX ORDER — ASPIRIN 81 MG/1
81 TABLET, CHEWABLE ORAL DAILY
COMMUNITY

## 2022-11-13 RX ORDER — LIDOCAINE 50 MG/G
1 PATCH TOPICAL EVERY 24 HOURS
Status: DISCONTINUED | OUTPATIENT
Start: 2022-11-13 | End: 2022-11-18 | Stop reason: HOSPADM

## 2022-11-13 RX ORDER — LISINOPRIL 5 MG/1
2.5 TABLET ORAL
Status: DISCONTINUED | OUTPATIENT
Start: 2022-11-14 | End: 2022-11-13

## 2022-11-13 RX ORDER — HEPARIN SODIUM 1000 [USP'U]/ML
INJECTION, SOLUTION INTRAVENOUS; SUBCUTANEOUS
Status: COMPLETED
Start: 2022-11-13 | End: 2022-11-13

## 2022-11-13 RX ORDER — NITROGLYCERIN 0.4 MG/1
0.4 TABLET SUBLINGUAL
Status: DISCONTINUED | OUTPATIENT
Start: 2022-11-13 | End: 2022-11-18 | Stop reason: HOSPADM

## 2022-11-13 RX ORDER — HEPARIN SODIUM 200 [USP'U]/100ML
INJECTION, SOLUTION INTRAVENOUS
Status: COMPLETED
Start: 2022-11-13 | End: 2022-11-13

## 2022-11-13 RX ADMIN — ASPIRIN 81 MG 81 MG: 81 TABLET ORAL at 13:37

## 2022-11-13 RX ADMIN — HEPARIN SODIUM: 1000 INJECTION, SOLUTION INTRAVENOUS; SUBCUTANEOUS at 13:48

## 2022-11-13 RX ADMIN — ENOXAPARIN SODIUM 60 MG: 60 INJECTION SUBCUTANEOUS at 05:48

## 2022-11-13 RX ADMIN — LIDOCAINE 1 PATCH: 700 PATCH TOPICAL at 17:28

## 2022-11-13 RX ADMIN — NITROGLYCERIN 10 ML: 20 INJECTION INTRAVENOUS at 13:48

## 2022-11-13 RX ADMIN — IOHEXOL 131 ML: 350 INJECTION, SOLUTION INTRAVENOUS at 14:12

## 2022-11-13 RX ADMIN — NITROGLYCERIN 0.4 MG: 0.4 TABLET, ORALLY DISINTEGRATING SUBLINGUAL at 08:55

## 2022-11-13 RX ADMIN — FENTANYL CITRATE 25 MCG: 50 INJECTION INTRAMUSCULAR; INTRAVENOUS at 14:09

## 2022-11-13 RX ADMIN — CLOPIDOGREL BISULFATE 75 MG: 75 TABLET ORAL at 05:49

## 2022-11-13 RX ADMIN — NITROGLYCERIN 1 PATCH: 0.1 PATCH TRANSDERMAL at 11:30

## 2022-11-13 RX ADMIN — SODIUM CHLORIDE: 9 INJECTION, SOLUTION INTRAVENOUS at 10:54

## 2022-11-13 RX ADMIN — ATORVASTATIN CALCIUM 40 MG: 40 TABLET, FILM COATED ORAL at 17:28

## 2022-11-13 RX ADMIN — SODIUM CHLORIDE: 9 INJECTION, SOLUTION INTRAVENOUS at 14:50

## 2022-11-13 RX ADMIN — MIDAZOLAM HYDROCHLORIDE 1 MG: 1 INJECTION, SOLUTION INTRAMUSCULAR; INTRAVENOUS at 14:10

## 2022-11-13 RX ADMIN — MORPHINE SULFATE 2 MG: 4 INJECTION INTRAVENOUS at 09:37

## 2022-11-13 RX ADMIN — IOHEXOL 65 ML: 350 INJECTION, SOLUTION INTRAVENOUS at 03:00

## 2022-11-13 RX ADMIN — METOPROLOL TARTRATE 12.5 MG: 25 TABLET, FILM COATED ORAL at 05:50

## 2022-11-13 RX ADMIN — TRAMADOL HYDROCHLORIDE 50 MG: 50 TABLET, COATED ORAL at 19:50

## 2022-11-13 RX ADMIN — LEVOTHYROXINE SODIUM 125 MCG: 0.12 TABLET ORAL at 05:49

## 2022-11-13 RX ADMIN — LIDOCAINE HYDROCHLORIDE: 20 INJECTION, SOLUTION INFILTRATION; PERINEURAL at 13:47

## 2022-11-13 RX ADMIN — Medication 10 MG: at 21:27

## 2022-11-13 RX ADMIN — ACETAMINOPHEN 650 MG: 325 TABLET, FILM COATED ORAL at 00:45

## 2022-11-13 RX ADMIN — DIPHENHYDRAMINE HYDROCHLORIDE 50 MG: 50 INJECTION INTRAMUSCULAR; INTRAVENOUS at 14:01

## 2022-11-13 RX ADMIN — FUROSEMIDE 20 MG: 20 TABLET ORAL at 17:33

## 2022-11-13 RX ADMIN — SENNOSIDES AND DOCUSATE SODIUM 2 TABLET: 50; 8.6 TABLET ORAL at 05:49

## 2022-11-13 RX ADMIN — METOPROLOL TARTRATE 12.5 MG: 25 TABLET, FILM COATED ORAL at 17:33

## 2022-11-13 RX ADMIN — THIAMINE HYDROCHLORIDE 100 MG: 100 INJECTION, SOLUTION INTRAMUSCULAR; INTRAVENOUS at 11:08

## 2022-11-13 RX ADMIN — VERAPAMIL HYDROCHLORIDE 2.5 MG: 2.5 INJECTION, SOLUTION INTRAVENOUS at 13:48

## 2022-11-13 RX ADMIN — LISINOPRIL 5 MG: 5 TABLET ORAL at 05:49

## 2022-11-13 RX ADMIN — HEPARIN SODIUM 2000 UNITS: 200 INJECTION, SOLUTION INTRAVENOUS at 13:43

## 2022-11-13 RX ADMIN — FENTANYL CITRATE 100 MCG: 50 INJECTION INTRAMUSCULAR; INTRAVENOUS at 13:54

## 2022-11-13 ASSESSMENT — ENCOUNTER SYMPTOMS
BLOOD IN STOOL: 0
CHILLS: 0
MYALGIAS: 0
DIZZINESS: 0
ABDOMINAL PAIN: 0
FOCAL WEAKNESS: 0
PALPITATIONS: 0
SHORTNESS OF BREATH: 0
DIARRHEA: 0
HEADACHES: 0
COUGH: 0
FEVER: 0
NAUSEA: 0
CONSTIPATION: 0

## 2022-11-13 ASSESSMENT — PAIN DESCRIPTION - PAIN TYPE
TYPE: ACUTE PAIN

## 2022-11-13 ASSESSMENT — FIBROSIS 4 INDEX: FIB4 SCORE: 2.52

## 2022-11-13 NOTE — ASSESSMENT & PLAN NOTE
Hx of GBS with respiratory failure requiring intubation and ICU stay  Deformity of bilateral fingers contractures

## 2022-11-13 NOTE — CARE PLAN
The patient is Watcher - Medium risk of patient condition declining or worsening         Progress made toward(s) clinical / shift goals:    Problem: Pain - Standard  Goal: Alleviation of pain or a reduction in pain to the patient’s comfort goal  Outcome: Progressing     Problem: Knowledge Deficit - Standard  Goal: Patient and family/care givers will demonstrate understanding of plan of care, disease process/condition, diagnostic tests and medications  Outcome: Progressing

## 2022-11-13 NOTE — ED NOTES
Pt resting comfortably.   Call light within reach  Personal belongings in reach  Bathroom and comfort needs met  VSS

## 2022-11-13 NOTE — PROGRESS NOTES
Dr Maloney notified, pt reporting 10/10 chest pain. STAT EKG ordered.  Dr Malnoey is coming to the bedside.

## 2022-11-13 NOTE — ED TRIAGE NOTES
[place] [facility] [place]  C/O LT sided CP, started 2 hours PTA.  Reports dizziness.  No pain med taken prior to EMS arrival.  Per EMS, ASA 325mg PO given, VS: 143/85 HR 84, 100% RA. NSR.  Pt refused IV.

## 2022-11-13 NOTE — PROCEDURES
Cardiac Catheterization report    11/13/2022  2:29 PM    Referring MD: Dr. Roca    Indication/Preoperative diagnosis:  Atypical chest pain   Abnormal troponin  History of recurrent bioprosthetic aortic valve replacements  Possible CAD, patient is unsure of what open heart procedures he has had done despite being recurrent interventions.  No records available.  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  HEMODYNAMICS   Ao: 86/61 mmHg   LVEDP: 16 mmHg   Gradient on LV pullback: Yes,     II. CORONARY ANGIOGRAPHY:  Left main coronary artery: Moderate caliber bifurcating no CAD  Left anterior descending artery: Large caliber wrapping around the apex and supplying the apical two thirds of the inferior wall.  Left circumflex coronary artery: Large caliber and dominant chronic totally occluded at its most distal portion which receives well-developed collaterals from the right system.  No grafts identified.  Right coronary artery: High anterior takeoff small to moderate caliber vessel supplying a collateral to the distal circumflex.    III.  AORTOGRAM:  Normal caliber thoracic aorta    Procedure details:  The risks and benefits of cardiac catheterization and possible intervention were explained to the patient including  death, heart attack, stroke, and emergency surgery.  The patient verbalized understanding and wished to proceed.  The patient was brought to the cardiac catheterization laboratory in the fasting state and prepped and draped in the usual sterile fashion.  The right groin was locally anesthetized with lidocaine and the right femoral artery under direct ultrasound guidance after fluoroscopic localization was punctured with a single front wall stick and 6-Trinidadian equipment was utilized.  Coronary angiography was performed using standard diagnostic catheters in the usual fashion and used to cross the aortic valve to perform  left heart catheterization and exchanged for 6 Trinidadian pigtail catheter seated above the aortic valve used to obtain thoracic angiography with 20 cc/s 40 cc injection.  All catheters and guidewires were removed.  After angiographic evaluation of the right femoral artery it was noted that there was partial stenosis of the artery at the arteriotomy site likely related to multiple prior intra-arterial closure devices.  Therefore a Vascade extra arterial closure device was deployed in the normal fashion without difficulty with excellent primary hemostasis.  Patient with Cath Lab in stable condition.    Complications:  None apparent    Sedation time:  Moderate sedation directly monitored by me during the case while supervising the administration of the sedation medication by an independent trained RN to assist in the monitoring of the patient's level of consciousness and physiological status. I, the supervising physician was present the entire time from beginning of medication administration until the end of the procedure from 1347 until 1420. For detailed administration records please see the moderate sedation documentation in the media tab.      Jan Morrow MD, FACC, [facility]

## 2022-11-13 NOTE — ASSESSMENT & PLAN NOTE
Elevated ALT more than AST, abdominal exam no tenderness.   Does have history of alcohol binging, last drink binge >5 drinks 2 weeks ago, last drink of 1 beer 2 days ago  US RUQ - resected gallbladder, liver homogenous no mass  -Continue to monitor LFTs while on statin  -Tylenol prn dose limit <2g

## 2022-11-13 NOTE — PROGRESS NOTES
Bedside report received from NOC RN. Assumed care of pt. Pt awake, laying in bed. A/Ox4, VSS. Pt reporting 10/10 chest pain that has not changed since last night. Will reach out to [employer] team immediately.  Pt educated to call before getting out of bed. POC reviewed and white board updated. Call light in reach. Bed locked in lowest position with 2 upper bed rails up. Bed alarm on.

## 2022-11-13 NOTE — CONSULTS
Reason for Consult:  Asked by Dr Harjit Noonan M.D. to see this patient with chest pain and concern for NSTEMI  Patient's PCP: Pcp Pt States None    CC:   Chief Complaint   Patient presents with    Chest Pain     X 2 hours. Came in by ems. EMS gave 324 asa pta       HPI:  Kristopher Leroy is a 46 year old man with history of valve replacement at [facility] (2015, previously replaced two times in [facility] and the [facility] in 2001 and 2011), CAD (patient reports prior MI and possible CABG), HTN, dyslipidemia, and Guillian Culloden who presented with chest pain. Cardiology is consulted for NSTEMI.    The patient endorses prior distant MI and thinks he had bypass surgery but no stents.  The patient reports having valve replaced a total of 3 times, but does not know which valve.  Unfortunately, the records are being requested, and not available at this time.     The patient is travelling to see his sister, he is from [facility] and the [facility].  He started having chest pain yesterday when he was walking to catch a train.  He reports associated shortness of breath.  He denies any orthopnea, PND, or leg swelling. No palpitations.  No syncope or presyncopal episodes.       Medications / Drug list prior to admission:  No current facility-administered medications on file prior to encounter.     No current outpatient medications on file prior to encounter.       Current list of administered Medications:    Current Facility-Administered Medications:     acetaminophen (Tylenol) tablet 650 mg, 650 mg, Oral, Q4HRS PRN, Monica Gomes, A.P.R.N., 650 mg at 11/13/22 0045    morphine 4 MG/ML injection 2 mg, 2 mg, Intravenous, Once, Brian Maloney M.D.    nitroglycerin (NITROSTAT) tablet 0.4 mg, 0.4 mg, Sublingual, Q5 MIN PRN, Brian Maloney M.D.    senna-docusate (PERICOLACE or SENOKOT S) 8.6-50 MG per tablet 2 Tablet, 2 Tablet, Oral, BID, 2 Tablet at 11/13/22 0549 **AND** polyethylene glycol/lytes (MIRALAX) PACKET 1 Packet, 1 Packet, Oral, QDAY  PRN **AND** magnesium hydroxide (MILK OF MAGNESIA) suspension 30 mL, 30 mL, Oral, QDAY PRN **AND** bisacodyl (DULCOLAX) suppository 10 mg, 10 mg, Rectal, QDAY PRN, Ángel Dixon M.D.    enoxaparin (Lovenox) inj 60 mg, 60 mg, Subcutaneous, Q12HRS, Ángel Dixon M.D., 60 mg at 11/13/22 0548    clopidogrel (PLAVIX) tablet 75 mg, 75 mg, Oral, DAILY, Ángel Dixon M.D., 75 mg at 11/13/22 0549    metoprolol tartrate (LOPRESSOR) tablet 12.5 mg, 12.5 mg, Oral, TWICE DAILY, Ángel Dixon M.D., 12.5 mg at 11/13/22 0550    lisinopril (PRINIVIL) tablet 5 mg, 5 mg, Oral, Q DAY, Ángel Dixon M.D., 5 mg at 11/13/22 0549    ondansetron (ZOFRAN) syringe/vial injection 4 mg, 4 mg, Intravenous, Q4HRS PRN, Ángel Dixon M.D.    ondansetron (ZOFRAN ODT) dispertab 4 mg, 4 mg, Oral, Q4HRS PRN, Ángel Dixon M.D.    promethazine (PHENERGAN) tablet 12.5-25 mg, 12.5-25 mg, Oral, Q4HRS PRN, Ángel Dixon M.D.    promethazine (PHENERGAN) suppository 12.5-25 mg, 12.5-25 mg, Rectal, Q4HRS PRN, Ángel Dixon M.D.    prochlorperazine (COMPAZINE) injection 5-10 mg, 5-10 mg, Intravenous, Q4HRS PRN, Ángel Dixon M.D.    atorvastatin (LIPITOR) tablet 40 mg, 40 mg, Oral, Q EVENING, Ángel Dixon M.D., 40 mg at 11/12/22 0963    levothyroxine (SYNTHROID) tablet 125 mcg, 125 mcg, Oral, AM Ángel CHRISTINE M.D., 125 mcg at 11/13/22 0549    Past Medical History:   Diagnosis Date    Elevated cholesterol     Guillain-[place] (HCC)     Hypertension     Hyperthyroidism        Past Surgical History:   Procedure Laterality Date    OTHER CARDIAC SURGERY      CABG x 3       No family history on file.  Patient family history was personally reviewed, no pertinent family history to current presentation         ALLERGIES:  Allergies   Allergen Reactions    Penicillins Unspecified     .       Review of systems:  A complete review of symptoms was reviewed with patient. This is reviewed in H&P and PMH. ALL  "OTHERS reviewed and negative    Physical exam:  Patient Vitals for the past 24 hrs:   BP Temp Temp src Pulse Resp SpO2 Height Weight   22 0807 124/64 -- -- 70 -- -- -- --   22 0806 113/65 -- -- 67 -- -- -- --   22 0747 126/71 36.5 °C (97.7 °F) Temporal 79 17 93 % -- --   22 0400 111/64 -- -- -- -- -- -- --   22 0355 111/64 36.9 °C (98.4 °F) Temporal 72 16 94 % -- --   22 0000 128/76 36.6 °C (97.8 °F) Temporal 81 12 95 % -- 60.5 kg (133 lb 6.1 oz)   22 2201 113/54 -- -- 77 15 -- -- --   22 1751 116/62 -- -- 76 -- 95 % -- --   22 1701 129/64 -- -- 79 16 95 % -- --   22 1617 -- 37.3 °C (99.1 °F) Temporal -- -- -- -- --   22 1559 -- -- -- -- -- -- 1.702 m (5' 7\") --   22 1554 (!) 143/85 -- -- 84 18 100 % -- --   22 1553 -- -- -- -- -- -- 1.651 m (5' 5\") 72.6 kg (160 lb)     General: No acute distress.   EYES: no jaundice  HEENT: OP clear   Neck: No bruits No JVD.   CVS: RRR. S1 + S2. No M/R/G  Resp: CTAB. No wheezing or crackles/rhonchi.  Abdomen: Soft, NT, ND,  Skin: Grossly nothing acute no obvious rashes  Neurological: Alert, Moves all extremities, no cranial nerve defects on limited exam  Extremities: Pulse 2+ in b/l LE.  No edema. No cyanosis.       Data:  Laboratory studies personally reviewed by me:  Recent Results (from the past 24 hour(s))   EKG    Collection Time: 22  4:05 PM   Result Value Ref Range    Report       [facility] Emergency Dept[facility]    Test Date:  2022  Pt Name:    KATTY HOLLAND           Department: ER  MRN:        [id number]                      Room:       [facility]  Gender:     Male                         Technician: [id number]  :        1975                   Requested By:CHARITY POSADA  Order #:    [id number]                    Reading MD: CHARITY POSADA MD    Measurements  Intervals                                Axis  Rate:       77                           P:          44  NY:         166 "                          QRS:        112  QRSD:       109                          T:          -19  QT:         403  QTc:        457    Interpretive Statements  Sinus rhythm  Ventricular premature complex  Probable left atrial enlargement  Left posterior fascicular block  Nonspecific T abnormalities, lateral leads  Borderline ST elevation, anterior leads  No previous ECG available for comparison  Electronically Signed On 11- 0:41:25 PST by CHARITY POSADA MD     CBC WITH DIFFERENTIAL    Collection Time: 11/12/22  4:27 PM   Result Value Ref Range    WBC 5.1 4.8 - 10.8 K/uL    RBC 3.81 (L) 4.70 - 6.10 M/uL    Hemoglobin 13.7 (L) 14.0 - 18.0 g/dL    Hematocrit 40.0 (L) 42.0 - 52.0 %    .0 (H) 81.4 - 97.8 fL    MCH 36.0 (H) 27.0 - 33.0 pg    MCHC 34.3 33.7 - 35.3 g/dL    RDW 51.3 (H) 35.9 - 50.0 fL    Platelet Count 127 (L) 164 - 446 K/uL    MPV 10.3 9.0 - 12.9 fL    Neutrophils-Polys 79.90 (H) 44.00 - 72.00 %    Lymphocytes 13.40 (L) 22.00 - 41.00 %    Monocytes 6.10 0.00 - 13.40 %    Eosinophils 0.20 0.00 - 6.90 %    Basophils 0.20 0.00 - 1.80 %    Immature Granulocytes 0.20 0.00 - 0.90 %    Nucleated RBC 0.00 /100 WBC    Neutrophils (Absolute) 4.04 1.82 - 7.42 K/uL    Lymphs (Absolute) 0.68 (L) 1.00 - 4.80 K/uL    Monos (Absolute) 0.31 0.00 - 0.85 K/uL    Eos (Absolute) 0.01 0.00 - 0.51 K/uL    Baso (Absolute) 0.01 0.00 - 0.12 K/uL    Immature Granulocytes (abs) 0.01 0.00 - 0.11 K/uL    NRBC (Absolute) 0.00 K/uL   Comp Metabolic Panel    Collection Time: 11/12/22  4:27 PM   Result Value Ref Range    Sodium 138 135 - 145 mmol/L    Potassium 3.8 3.6 - 5.5 mmol/L    Chloride 100 96 - 112 mmol/L    Co2 25 20 - 33 mmol/L    Anion Gap 13.0 7.0 - 16.0    Glucose 102 (H) 65 - 99 mg/dL    Bun 23 (H) 8 - 22 mg/dL    Creatinine 0.95 0.50 - 1.40 mg/dL    Calcium 9.2 8.5 - 10.5 mg/dL    AST(SGOT) 61 (H) 12 - 45 U/L    ALT(SGPT) 80 (H) 2 - 50 U/L    Alkaline Phosphatase 143 (H) 30 - 99 U/L    Total Bilirubin 0.3 0.1 -  1.5 mg/dL    Albumin 5.1 (H) 3.2 - 4.9 g/dL    Total Protein 7.7 6.0 - 8.2 g/dL    Globulin 2.6 1.9 - 3.5 g/dL    A-G Ratio 2.0 g/dL   TROPONIN    Collection Time: 22  4:27 PM   Result Value Ref Range    Troponin T 57 (H) 6 - 19 ng/L   Prothrombin Time    Collection Time: 22  4:27 PM   Result Value Ref Range    PT 13.2 12.0 - 14.6 sec    INR 1.01 0.87 - 1.13   APTT    Collection Time: 22  4:27 PM   Result Value Ref Range    APTT 30.2 24.7 - 36.0 sec   ESTIMATED GFR    Collection Time: 22  4:27 PM   Result Value Ref Range    GFR (CKD-EPI) 99 >60 mL/min/1.73 m 2   VITAMIN B12    Collection Time: 22  4:27 PM   Result Value Ref Range    Vitamin B12 -True Cobalamin 369 211 - 911 pg/mL   HEMOGLOBIN A1C    Collection Time: 22  4:27 PM   Result Value Ref Range    Glycohemoglobin 5.5 4.0 - 5.6 %    Est Avg Glucose 111 mg/dL   TSH WITH REFLEX TO FT4    Collection Time: 22  4:27 PM   Result Value Ref Range    TSH 13.900 (H) 0.380 - 5.330 uIU/mL   D-DIMER    Collection Time: 22  4:27 PM   Result Value Ref Range    D-Dimer Screen 0.65 (H) 0.00 - 0.50 ug/mL (FEU)   FREE THYROXINE    Collection Time: 22  4:27 PM   Result Value Ref Range    Free T-4 1.16 0.93 - 1.70 ng/dL   TROPONIN    Collection Time: 22  6:45 PM   Result Value Ref Range    Troponin T 54 (H) 6 - 19 ng/L   EKG    Collection Time: 22 10:20 PM   Result Value Ref Range    Report       [facility] Emergency Dept.    Test Date:  2022  Pt Name:    KATTY HOLLAND           Department: ER  MRN:        [id number]                      Room:        14  Gender:     Male                         Technician: [id number]  :        1975                   Requested By:CHARITY POSADA  Order #:    [id number]                    Reading MD:    Measurements  Intervals                                Axis  Rate:       81                           P:          10  SD:         229                           QRS:        104  QRSD:       113                          T:          -40  QT:         393  QTc:        457    Interpretive Statements  Sinus rhythm  Prolonged CT interval  Probable left atrial enlargement  Borderline intraventricular conduction delay  Nonspecific T abnormalities, lateral leads  Compared to ECG 11/12/2022 16:05:01  First degree AV block now present  Ventricular premature complex(es) no longer present  Left posterior fascicular block no longer pre sent  ST (T wave) deviation no longer present  T-wave abnormality still present     Troponin - STAT Once    Collection Time: 11/12/22 11:27 PM   Result Value Ref Range    Troponin T 57 (H) 6 - 19 ng/L   CBC WITH DIFFERENTIAL    Collection Time: 11/13/22  4:00 AM   Result Value Ref Range    WBC 4.1 (L) 4.8 - 10.8 K/uL    RBC 3.66 (L) 4.70 - 6.10 M/uL    Hemoglobin 12.9 (L) 14.0 - 18.0 g/dL    Hematocrit 38.2 (L) 42.0 - 52.0 %    .4 (H) 81.4 - 97.8 fL    MCH 35.2 (H) 27.0 - 33.0 pg    MCHC 33.8 33.7 - 35.3 g/dL    RDW 51.4 (H) 35.9 - 50.0 fL    Platelet Count 115 (L) 164 - 446 K/uL    MPV 10.9 9.0 - 12.9 fL    Neutrophils-Polys 71.80 44.00 - 72.00 %    Lymphocytes 19.70 (L) 22.00 - 41.00 %    Monocytes 7.60 0.00 - 13.40 %    Eosinophils 0.20 0.00 - 6.90 %    Basophils 0.20 0.00 - 1.80 %    Immature Granulocytes 0.50 0.00 - 0.90 %    Nucleated RBC 0.00 /100 WBC    Neutrophils (Absolute) 2.92 1.82 - 7.42 K/uL    Lymphs (Absolute) 0.80 (L) 1.00 - 4.80 K/uL    Monos (Absolute) 0.31 0.00 - 0.85 K/uL    Eos (Absolute) 0.01 0.00 - 0.51 K/uL    Baso (Absolute) 0.01 0.00 - 0.12 K/uL    Immature Granulocytes (abs) 0.02 0.00 - 0.11 K/uL    NRBC (Absolute) 0.00 K/uL   Comp Metabolic Panel    Collection Time: 11/13/22  4:00 AM   Result Value Ref Range    Sodium 137 135 - 145 mmol/L    Potassium 3.8 3.6 - 5.5 mmol/L    Chloride 100 96 - 112 mmol/L    Co2 25 20 - 33 mmol/L    Anion Gap 12.0 7.0 - 16.0    Glucose 134 (H) 65 - 99 mg/dL    Bun 21 8 - 22 mg/dL     Creatinine 0.75 0.50 - 1.40 mg/dL    Calcium 8.6 8.5 - 10.5 mg/dL    AST(SGOT) 47 (H) 12 - 45 U/L    ALT(SGPT) 64 (H) 2 - 50 U/L    Alkaline Phosphatase 116 (H) 30 - 99 U/L    Total Bilirubin 0.3 0.1 - 1.5 mg/dL    Albumin 4.5 3.2 - 4.9 g/dL    Total Protein 6.8 6.0 - 8.2 g/dL    Globulin 2.3 1.9 - 3.5 g/dL    A-G Ratio 2.0 g/dL   MAGNESIUM    Collection Time: 22  4:00 AM   Result Value Ref Range    Magnesium 1.9 1.5 - 2.5 mg/dL   HEPATITIS PANEL ACUTE(4 COMPONENTS)    Collection Time: 22  4:00 AM   Result Value Ref Range    Hepatitis B Surface Antigen Non-Reactive Non-Reactive    Hepatitis B Cors Ab,IgM Non-Reactive Non-Reactive    Hepatitis A Virus Ab, IgM Non-Reactive Non-Reactive    Hepatitis C Antibody Non-Reactive Non-Reactive   Lipid Profile    Collection Time: 22  4:00 AM   Result Value Ref Range    Cholesterol,Tot 114 100 - 199 mg/dL    Triglycerides 31 0 - 149 mg/dL    HDL 57 >=40 mg/dL    LDL 51 <100 mg/dL   TROPONIN    Collection Time: 22  4:00 AM   Result Value Ref Range    Troponin T 64 (H) 6 - 19 ng/L   ESTIMATED GFR    Collection Time: 22  4:00 AM   Result Value Ref Range    GFR (CKD-EPI) 111 >60 mL/min/1.73 m 2   EKG    Collection Time: 22  8:02 AM   Result Value Ref Range    Report       [facility]    Test Date:  2022  Pt Name:    KATTY HOLLAND           Department: 171  MRN:        [id number]                      Room:       T732  Gender:     Male                         Technician: [id number]  :        1975                   Requested By:MICHAEL PADILLA  Order #:    [id number]                    Jone MD:    Measurements  Intervals                                Axis  Rate:       67                           P:          76  TN:         259                          QRS:        104  QRSD:       114                          T:          -58  QT:         453  QTc:        479    Interpretive Statements  Sinus rhythm  Prolonged TN interval  Probable  left atrial enlargement  Left posterior fascicular block  Consider left ventricular hypertrophy  Nonspecific T abnormalities, diffuse leads  Anterior ST elevation, probably due to LVH  Borderline prolonged QT interval  Compared to ECG 11/12/2022 22:20:10  Left posterior fascicular block now present  Left ventricu lar hypertrophy now present  ST (T wave) deviation now present  T-wave abnormality still present         Imaging:  CT-CTA CHEST PULMONARY ARTERY W/ RECONS   Final Result         1. No CT evidence of pulmonary embolism.      2. Mild hazy groundglass opacities throughout both lungs, right slightly more than left, likely mild pulmonary edema.      3. Patchy bibasilar atelectasis.         US-RUQ   Final Result      1. Status post cholecystectomy. The common bile duct is obscured by overlying bowel gas.   2. Homogeneous appearance of the liver with no hepatic mass.      DX-CHEST-PORTABLE (1 VIEW)   Final Result      1.  Mildly enlarged cardiac silhouette with vascular congestion.      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 *      Assessment / Plan:    NSTEMI  CAD  -Switch from therapeutic lovenox to heparin gtt  -Continue home asa and plavix  -Continue high intensity statin  -Due to Lovenox therapeutic dose being given this morning, will plan for coronary angiogram tomorrow unless patient has recurrent symptoms/urgent findings on EKG. Keep NPO after midnight for coronary angiogram  tomorrow.  Heparin gtt. as above  -EKG reviewed with the patient was having 10 out of 10 chest pain, no ST elevations meeting criteria.  Start nitro patch and reassess symptoms this afternoon.  Keep n.p.o. for now.  -Continue metoprolol 12.5 mg twice daily  -Request [facility]    Prior valve replacement  -Obtain echocardiogram to assess LVEF and valvular abnormalities.     Hypertension  -Continue metoprolol as above  -Continue lisinopril 5 mg daily    I personally discussed his case with  Dr Harjit Noonan M.D.      It is my pleasure to participate in the care of Mr. Sergio Leroy.  Please do not hesitate to contact me with questions or concerns.    Radhika Roca MD   Cardiologist [facility]    11/13/2022    Please note that this dictation was created using voice recognition software. There may be errors I did not discover before finalizing the note.

## 2022-11-13 NOTE — PROGRESS NOTES
[facility] with cathlabRN. Received report from cathlab RN, Pt assessed, A&Ox4, postprocedure vitals initiated, vitals stable,no SOB Noted. Right groin sheath site covered with gauze dressing, CDI. CMT WDL on RLE. Pedal pulses +2. Pt on bedrest until 1820. Pt updated on plan of care, Call light within reach, personal belongings within reach.  Bed in lowest position.  Tele monitor on and monitor room notified, rhythm is SR 77. Will continue to monitor. Hourly rounding in place.

## 2022-11-13 NOTE — PROGRESS NOTES
[facility] Internal Medicine Daily Progress Note    Date of Service  11/13/2022    R Team: R IM Green Team   Attending: Harjit Noonan M.d.  Senior Resident: Dr. Harrington  Intern:  Dr. Maloney  Contact Number: [phone]    Chief Complaint  Kristopher Leroy is a 46 y.o. male admitted 11/12/2022 with chest pain    ID  Mr. Kristopher Leroy is 45 yo male hx of Guillain-[place], hypothyroidism, valvular dz/CAD, hx of open heart surgery  with bioprosthetic aortic valve on DAPT who presented on 11/12/2022 with chest pain, cath results no evidence of ACS and further evaluation with TTE pending, obtaining outside records.    Hospital Course  Mr. Kristopher Leroy is 45 yo male hx of Guillain-Kempton, hypothyroidism, valvular dz/CAD, hx of open heart surgery with bioprosthetic aortic valve on DAPT who presented on 11/12/2022 with chest pain. Patient live in [place] and was traveling in [place] via train and stopped here due to chest pain, pressure that increased with movement and SOB, non-radiating. Takes aspirin, plavix, lasix for hrt and valve dz. EKG with T inversion unsure if new, troponin elevated 70s now down trended to steady in 50s. Repeat EKG throughout the day 11/13, no changes seen with concern for STEMI although chest pain consistently 9-10/10. Cardiology consulted, Dr. Roca evaluated patient for cath lab today. Cath results no concerns for ACS, recs for reevaluation of bioprosthetic aortic valve dysfunction. TTE with reduced EF. Plan for CARLOS tomorrow, obtaining outside records for [facility].    Interval Problem Update  This am patient with 10/10 chest pain, relief with morphine briefly. Nitrogen patch w/o relief of chest pain. Denies shortness of breath, palpitations, edema, orthopnea.   Dr. Roca (Cardiology) evaluated him post TTE and patient taken to cath lab. Cath results no evidence of ACS, recs for reevaluation of bioprosthetic aortic valve dysfunction. Plan for CARLOS tomorrow, NPO  at midnight.     I have discussed this patient's plan of care and discharge plan at IDT rounds today with Case Management, Nursing, Nursing leadership, and other members of the IDT team.    Consultants/Specialty  cardiology    Code Status  Full Code    Disposition  Patient is not medically cleared for discharge.   Anticipate discharge to to home with close outpatient follow-up.  I have placed the appropriate orders for post-discharge needs.    Review of Systems  Review of Systems   Constitutional:  Negative for chills, fever and malaise/fatigue.   Respiratory:  Negative for cough and shortness of breath.    Cardiovascular:  Positive for chest pain. Negative for palpitations and leg swelling.   Gastrointestinal:  Negative for abdominal pain, blood in stool, constipation, diarrhea, melena and nausea.   Genitourinary:  Negative for dysuria and hematuria.   Musculoskeletal:  Negative for myalgias.   Neurological:  Negative for dizziness, focal weakness and headaches.      Physical Exam  Temp:  [36.5 °C (97.7 °F)-36.9 °C (98.4 °F)] 36.8 °C (98.2 °F)  Pulse:  [64-91] 84  Resp:  [12-18] 18  BP: (105-135)/(54-80) 113/68  SpO2:  [91 %-96 %] 95 %    Physical Exam  Constitutional:       General: He is not in acute distress.     Appearance: Normal appearance.   HENT:      Head: Normocephalic and atraumatic.      Right Ear: External ear normal.      Left Ear: External ear normal.      Nose: Nose normal.      Mouth/Throat:      Mouth: Mucous membranes are moist.   Eyes:      General: No scleral icterus.        Right eye: No discharge.         Left eye: No discharge.      Extraocular Movements: Extraocular movements intact.      Conjunctiva/sclera: Conjunctivae normal.      Pupils: Pupils are equal, round, and reactive to light.   Cardiovascular:      Rate and Rhythm: Normal rate and regular rhythm.      Pulses: Normal pulses.      Heart sounds: Murmur heard.      Comments: Sternotomy scar, left upper chest scar from past  pacemaker  Systolic murmur 3/6 aortic region  Pulmonary:      Effort: Pulmonary effort is normal. No respiratory distress.      Breath sounds: Normal breath sounds. No wheezing or rales.   Chest:      Chest wall: Tenderness present.   Abdominal:      General: Abdomen is flat. Bowel sounds are normal. There is no distension.      Palpations: Abdomen is soft.   Musculoskeletal:         General: Deformity present. No swelling or tenderness. Normal range of motion.      Cervical back: Normal range of motion.      Right lower leg: No edema.      Left lower leg: No edema.      Comments: Bilateral hands fingers are contracted   Skin:     General: Skin is warm and dry.      Coloration: Skin is not jaundiced.   Neurological:      General: No focal deficit present.      Mental Status: He is alert and oriented to person, place, and time.   Psychiatric:         Mood and Affect: Mood normal.         Behavior: Behavior normal.         Thought Content: Thought content normal.         Judgment: Judgment normal.       Fluids    Intake/Output Summary (Last 24 hours) at 11/13/2022 1800  Last data filed at 11/13/2022 0747  Gross per 24 hour   Intake --   Output 1780 ml   Net -1780 ml       Laboratory  Recent Labs     11/12/22 1627 11/13/22  0400 11/13/22  0945   WBC 5.1 4.1* 4.7*   RBC 3.81* 3.66* 3.52*   HEMOGLOBIN 13.7* 12.9* 12.4*   HEMATOCRIT 40.0* 38.2* 36.7*   .0* 104.4* 104.3*   MCH 36.0* 35.2* 35.2*   MCHC 34.3 33.8 33.8   RDW 51.3* 51.4* 51.6*   PLATELETCT 127* 115* 111*   MPV 10.3 10.9 10.3     Recent Labs     11/12/22  1627 11/13/22  0400 11/13/22  0945   SODIUM 138 137 138   POTASSIUM 3.8 3.8 3.9   CHLORIDE 100 100 102   CO2 25 25 26   GLUCOSE 102* 134* 104*   BUN 23* 21 20   CREATININE 0.95 0.75 0.57   CALCIUM 9.2 8.6 8.6     Recent Labs     11/12/22 1627 11/13/22  0945   APTT 30.2 42.5*   INR 1.01 1.10         Recent Labs     11/13/22  0400   TRIGLYCERIDE 31   HDL 57   LDL 51       Imaging  CT-CTA CHEST PULMONARY  ARTERY W/ RECONS   Final Result         1. No CT evidence of pulmonary embolism.      2. Mild hazy groundglass opacities throughout both lungs, right slightly more than left, likely mild pulmonary edema.      3. Patchy bibasilar atelectasis.         US-RUQ   Final Result      1. Status post cholecystectomy. The common bile duct is obscured by overlying bowel gas.   2. Homogeneous appearance of the liver with no hepatic mass.      DX-CHEST-PORTABLE (1 VIEW)   Final Result      1.  Mildly enlarged cardiac silhouette with vascular congestion.      EC-ECHOCARDIOGRAM COMPLETE W/O CONT    (Results Pending)   CL-LEFT HEART CATHETERIZATION WITH POSSIBLE INTERVENTION    (Results Pending)   EC-CARLOS W/O CONT    (Results Pending)        Assessment/Plan  Problem Representation:  Mr. Kristopher Leroy is 47 yo male hx of Guillain-Purcell, hypothyroidism, valvular dz/CAD, hx of open heart surgery  with bioprosthetic aortic valve on DAPT who presented on 11/12/2022 with chest pain, cath results no evidence of ACS and further evaluation with TTE pending, obtaining outside records.    * NSTEMI (non-ST elevated myocardial infarction) (HCC)- (present on admission)  Assessment & Plan  Ruled out by Cath results no ACS      Chest pain  Assessment & Plan  Chest pain and elevated troponin that has now been steady in 50s  History of aortic bioprosthetic valve replacement on DAPT, per patient replaced three times   Hx of pacemaker placement with infection requiring removal, has a loop recorder implanted  EKG showed T inversion, no prior records to compare if new. Obtaining [facility] and [facility].  -Cardiology following, apprec recs - cath completed no evidence of ACS, TTE plan for 11/14  -continue with home DAPT aspirin 81mg and plavix 75mg  -metoprolol 12.5 mg BID  -lisinopril 5mg      Elevated liver enzymes  Assessment & Plan  Elevated ALT more than AST, abdominal exam no tenderness.   Does have history of  alcohol binging, last drink binge >5 drinks 2 weeks ago, last drink of 1 beer 2 days ago  US RUQ - resected gallbladder, liver homogenous no mass  -Continue to monitor LFTs while on statin  -Tylenol prn dose limit <2g    Alcohol consumption binge drinking  Assessment & Plan  Binged drinking >5 drinks 2 weeks ago, last drink 1 beer 2 days ago  -low threshold CIWA  -seizure precaution    Macrocytic anemia  Assessment & Plan  H/H and MCV evident of macrocytic anemia  -B12, folate, Fe panel, ferriten pending    Dyslipidemia  Assessment & Plan  Continue atorvastatin 80 mg daily    Hyperthyroidism  Assessment & Plan  TSH elevated, T4 wnl  -Continue levothyroxine 125 mcg daily      Guillain-[place] (HCC)  Assessment & Plan  Hx of GBS with respiratory failure requiring intubation and ICU stay  Deformity of bilateral fingers contractures         VTE prophylaxis: SCDs/TEDs, lovenox    I have performed a physical exam and reviewed and updated ROS and Plan today (11/13/2022). In review of yesterday's note (11/12/2022), there are no changes except as documented above.

## 2022-11-13 NOTE — ED NOTES
Medicated pt per mar for pain - pt states minimal relief with first dose of morphine  Lights turned off for comfort   Door closed

## 2022-11-13 NOTE — ED NOTES
Med rec updated and complete. Allergies reviewed . Pt is not currently taking medicaitons.  No local pharmacy .      Confirmed name and date of birth.

## 2022-11-13 NOTE — H&P
Hospital Medicine History & Physical Note    Date of Service  11/12/2022    Primary Care Physician  Pcp Pt States None    Consultants  Chest pain    Code Status  Full Code    Chief Complaint  Chief Complaint   Patient presents with    Chest Pain     X 2 hours. Came in by ems. EMS gave 324 asa pta       History of Presenting Illness:    46-year-old male with history of Guillain-Barré, hypothyroidism, valve disease/coronary artery disease??  With history of open heart surgery?  Who presented 11/12 with chest pain.  Patient lives in [place] and he was traveling to [place] through the train however he stopped early enough for chest pain, pressure chest pain, increasing with movement associated with shortness of breath, severe pain and not radiated.  According to the patient he had heart disease and valve disease he is taking aspirin, Plavix and Lasix, on admission EKG showed T inversion however not sure if it is new, troponin was elevated 70s, Plavix with atorvastatin and Lovenox were started.    Patient has elevated liver enzymes, no abdominal pain, patient states he drinks alcohol however not significant.  Ultrasound will be ordered.    CODE STATUS was discussed with the patient and he wants to be full code.    I discussed the plan of care with patient and bedside RN.    Review of Systems  Review of Systems   Constitutional:  Negative for chills, fever and weight loss.   HENT:  Negative for ear pain, hearing loss and tinnitus.    Eyes:  Negative for blurred vision, double vision and photophobia.   Respiratory:  Negative for cough and hemoptysis.    Cardiovascular:  Positive for chest pain. Negative for palpitations, orthopnea and claudication.   Gastrointestinal:  Negative for abdominal pain, constipation, diarrhea, nausea and vomiting.   Genitourinary:  Negative for dysuria, frequency and urgency.   Musculoskeletal:  Negative for myalgias and neck pain.   Skin:  Negative for rash.   Neurological:  Negative for  dizziness, speech change and weakness.     Past Medical History   has a past medical history of Elevated cholesterol, Guillain-[place] (HCC), Hypertension, and Hyperthyroidism.    Surgical History   has a past surgical history that includes other cardiac surgery.     Family History  Significant family history of heart disease  Family history reviewed with patient. There is family history that is pertinent to the chief complaint.     Social History       Allergies  Allergies   Allergen Reactions    Penicillins Unspecified     .       Medications  None       Physical Exam  Temp:  [37.3 °C (99.1 °F)] 37.3 °C (99.1 °F)  Pulse:  [76-84] 76  Resp:  [16-18] 16  BP: (116-143)/(62-85) 116/62  SpO2:  [95 %-100 %] 95 %  Blood Pressure: 116/62   Temperature: 37.3 °C (99.1 °F)   Pulse: 76   Respiration: 16   Pulse Oximetry: 95 %       Physical Exam  Constitutional:       Appearance: He is not ill-appearing.   Eyes:      General: No scleral icterus.  Cardiovascular:      Rate and Rhythm: Normal rate.      Heart sounds: No murmur heard.  Pulmonary:      Effort: No respiratory distress.      Breath sounds: No wheezing.   Abdominal:      General: There is no distension.      Tenderness: There is no abdominal tenderness. There is no right CVA tenderness, left CVA tenderness or guarding.   Musculoskeletal:         General: Tenderness and deformity present.      Right lower leg: Edema present.      Left lower leg: Edema present.      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   Skin:     Coloration: Skin is not jaundiced.      Findings: No bruising, lesion or rash.   Neurological:      General: No focal deficit present.      Mental Status: He is alert. Mental status is at baseline. He is disoriented.      Cranial Nerves: No cranial nerve deficit.      Motor: No weakness.      Gait: Gait normal.       Laboratory:  Recent Labs     11/12/22  1627   WBC 5.1    RBC 3.81*   HEMOGLOBIN 13.7*   HEMATOCRIT 40.0*   .0*   MCH 36.0*   MCHC 34.3   RDW 51.3*   PLATELETCT 127*   MPV 10.3     Recent Labs     11/12/22  1627   SODIUM 138   POTASSIUM 3.8   CHLORIDE 100   CO2 25   GLUCOSE 102*   BUN 23*   CREATININE 0.95   CALCIUM 9.2     Recent Labs     11/12/22  1627   ALTSGPT 80*   ASTSGOT 61*   ALKPHOSPHAT 143*   TBILIRUBIN 0.3   GLUCOSE 102*     Recent Labs     11/12/22  1627   APTT 30.2   INR 1.01     No results for input(s): NTPROBNP in the last 72 hours.      Recent Labs     11/12/22  1627 11/12/22  1845   TROPONINT 57* 54*       Imaging:  DX-CHEST-PORTABLE (1 VIEW)   Final Result      1.  Mildly enlarged cardiac silhouette with vascular congestion.      EC-ECHOCARDIOGRAM COMPLETE W/O CONT    (Results Pending)   US-RUQ    (Results Pending)   CT-CTA CHEST PULMONARY ARTERY W/ RECONS    (Results Pending)       X-Ray:  I have personally reviewed the images and compared with prior images.  EKG:  I have personally reviewed the images and compared with prior images.    Assessment/Plan:  Justification for Admission Status  I anticipate this patient will require at least two midnights for appropriate medical management, necessitating inpatient admission because non-STEMI    Patient will need a Telemetry bed on CARDIOLOGY service .  The need is secondary to non-STEMI.    * NSTEMI (non-ST elevated myocardial infarction) (HCC)- (present on admission)  Assessment & Plan  Came with chest pain and elevated troponin 70s  History of heart disease?  With history of open heart surgery  EKG showed T inversion however not sure if it sinew  Continue atorvastatin and Plavix, metoprolol lisinopril and Lovenox twice daily  Echo and cardiology consult in a.m.  Trending troponin and monitor on telemetry  Since patient has pleuritic chest pain and shortness of breath, also using train for lung trip, will order CTA to rule out PE   [facility] was contacted for patient's record    [place]azan[place]  (HCC)  Assessment & Plan  Chronic  Patient has deformities on his hands      Dyslipidemia  Assessment & Plan  Continue atorvastatin 80 mg daily    Elevated liver enzymes  Assessment & Plan  ALT more than AST  Abdominal exam is benign  Denied any significant history of drinking alcohol  Check ultrasound and viral hepatitis panel  Labs daily    Hyperthyroidism  Assessment & Plan  Continue levothyroxine 125 mcg daily  Check TSH tomorrow morning      VTE prophylaxis: SCDs/TEDs and therapeutic anticoagulation with Lovenox

## 2022-11-14 ENCOUNTER — ANESTHESIA EVENT (OUTPATIENT)
Dept: CARDIOLOGY | Facility: MEDICAL CENTER | Age: 47
DRG: 281 | End: 2022-11-14

## 2022-11-14 ENCOUNTER — ANESTHESIA (OUTPATIENT)
Dept: CARDIOLOGY | Facility: MEDICAL CENTER | Age: 47
DRG: 281 | End: 2022-11-14

## 2022-11-14 ENCOUNTER — APPOINTMENT (OUTPATIENT)
Dept: CARDIOLOGY | Facility: MEDICAL CENTER | Age: 47
DRG: 281 | End: 2022-11-14
Attending: STUDENT IN AN ORGANIZED HEALTH CARE EDUCATION/TRAINING PROGRAM

## 2022-11-14 PROBLEM — E03.9 HYPOTHYROIDISM: Status: ACTIVE | Noted: 2022-11-14

## 2022-11-14 LAB
ALBUMIN SERPL BCP-MCNC: 4.4 G/DL (ref 3.2–4.9)
ALBUMIN/GLOB SERPL: 1.8 G/DL
ALP SERPL-CCNC: 114 U/L (ref 30–99)
ALT SERPL-CCNC: 69 U/L (ref 2–50)
ANION GAP SERPL CALC-SCNC: 12 MMOL/L (ref 7–16)
APPEARANCE UR: CLEAR
APTT PPP: 30.5 SEC (ref 24.7–36)
AST SERPL-CCNC: 58 U/L (ref 12–45)
BASOPHILS # BLD AUTO: 0.4 % (ref 0–1.8)
BASOPHILS # BLD: 0.02 K/UL (ref 0–0.12)
BILIRUB SERPL-MCNC: 0.4 MG/DL (ref 0.1–1.5)
BILIRUB UR QL STRIP.AUTO: NEGATIVE
BUN SERPL-MCNC: 23 MG/DL (ref 8–22)
CALCIUM SERPL-MCNC: 8.8 MG/DL (ref 8.5–10.5)
CHLORIDE SERPL-SCNC: 103 MMOL/L (ref 96–112)
CO2 SERPL-SCNC: 22 MMOL/L (ref 20–33)
COLOR UR: YELLOW
CREAT SERPL-MCNC: 0.8 MG/DL (ref 0.5–1.4)
EOSINOPHIL # BLD AUTO: 0.01 K/UL (ref 0–0.51)
EOSINOPHIL NFR BLD: 0.2 % (ref 0–6.9)
ERYTHROCYTE [DISTWIDTH] IN BLOOD BY AUTOMATED COUNT: 52.8 FL (ref 35.9–50)
FERRITIN SERPL-MCNC: 64.6 NG/ML (ref 22–322)
FLUAV RNA SPEC QL NAA+PROBE: NEGATIVE
FLUBV RNA SPEC QL NAA+PROBE: NEGATIVE
FOLATE SERPL-MCNC: 16.1 NG/ML
GFR SERPLBLD CREATININE-BSD FMLA CKD-EPI: 109 ML/MIN/1.73 M 2
GLOBULIN SER CALC-MCNC: 2.5 G/DL (ref 1.9–3.5)
GLUCOSE SERPL-MCNC: 112 MG/DL (ref 65–99)
GLUCOSE UR STRIP.AUTO-MCNC: NEGATIVE MG/DL
HCT VFR BLD AUTO: 37.1 % (ref 42–52)
HGB BLD-MCNC: 12.5 G/DL (ref 14–18)
IMM GRANULOCYTES # BLD AUTO: 0.01 K/UL (ref 0–0.11)
IMM GRANULOCYTES NFR BLD AUTO: 0.2 % (ref 0–0.9)
INR PPP: 1.04 (ref 0.87–1.13)
IRON SATN MFR SERPL: 35 % (ref 15–55)
IRON SERPL-MCNC: 116 UG/DL (ref 50–180)
KETONES UR STRIP.AUTO-MCNC: NEGATIVE MG/DL
LEUKOCYTE ESTERASE UR QL STRIP.AUTO: NEGATIVE
LYMPHOCYTES # BLD AUTO: 0.74 K/UL (ref 1–4.8)
LYMPHOCYTES NFR BLD: 14.8 % (ref 22–41)
MCH RBC QN AUTO: 35.4 PG (ref 27–33)
MCHC RBC AUTO-ENTMCNC: 33.7 G/DL (ref 33.7–35.3)
MCV RBC AUTO: 105.1 FL (ref 81.4–97.8)
MICRO URNS: ABNORMAL
MONOCYTES # BLD AUTO: 0.37 K/UL (ref 0–0.85)
MONOCYTES NFR BLD AUTO: 7.4 % (ref 0–13.4)
NEUTROPHILS # BLD AUTO: 3.84 K/UL (ref 1.82–7.42)
NEUTROPHILS NFR BLD: 77 % (ref 44–72)
NITRITE UR QL STRIP.AUTO: NEGATIVE
NRBC # BLD AUTO: 0 K/UL
NRBC BLD-RTO: 0 /100 WBC
PH UR STRIP.AUTO: 7.5 [PH] (ref 5–8)
PLATELET # BLD AUTO: 111 K/UL (ref 164–446)
PMV BLD AUTO: 10.5 FL (ref 9–12.9)
POTASSIUM SERPL-SCNC: 4.5 MMOL/L (ref 3.6–5.5)
PROT SERPL-MCNC: 6.9 G/DL (ref 6–8.2)
PROT UR QL STRIP: NEGATIVE MG/DL
PROTHROMBIN TIME: 13.5 SEC (ref 12–14.6)
RBC # BLD AUTO: 3.53 M/UL (ref 4.7–6.1)
RBC UR QL AUTO: NEGATIVE
SARS-COV-2 RNA RESP QL NAA+PROBE: NOTDETECTED
SODIUM SERPL-SCNC: 137 MMOL/L (ref 135–145)
SP GR UR STRIP.AUTO: >=1.045
SPECIMEN SOURCE: NORMAL
TIBC SERPL-MCNC: 333 UG/DL (ref 250–450)
UFH PPP CHRO-ACNC: 0.6 IU/ML
UFH PPP CHRO-ACNC: <0.1 IU/ML
UIBC SERPL-MCNC: 217 UG/DL (ref 110–370)
UROBILINOGEN UR STRIP.AUTO-MCNC: 1 MG/DL
VIT B12 SERPL-MCNC: 348 PG/ML (ref 211–911)
WBC # BLD AUTO: 5 K/UL (ref 4.8–10.8)

## 2022-11-14 PROCEDURE — 770020 HCHG ROOM/CARE - TELE (206)

## 2022-11-14 PROCEDURE — 160002 HCHG RECOVERY MINUTES (STAT)

## 2022-11-14 PROCEDURE — 700102 HCHG RX REV CODE 250 W/ 637 OVERRIDE(OP): Performed by: STUDENT IN AN ORGANIZED HEALTH CARE EDUCATION/TRAINING PROGRAM

## 2022-11-14 PROCEDURE — 700102 HCHG RX REV CODE 250 W/ 637 OVERRIDE(OP)

## 2022-11-14 PROCEDURE — 87040 BLOOD CULTURE FOR BACTERIA: CPT

## 2022-11-14 PROCEDURE — 99233 SBSQ HOSP IP/OBS HIGH 50: CPT | Mod: GC | Performed by: INTERNAL MEDICINE

## 2022-11-14 PROCEDURE — 93312 ECHO TRANSESOPHAGEAL: CPT | Mod: 26 | Performed by: INTERNAL MEDICINE

## 2022-11-14 PROCEDURE — 700101 HCHG RX REV CODE 250: Performed by: STUDENT IN AN ORGANIZED HEALTH CARE EDUCATION/TRAINING PROGRAM

## 2022-11-14 PROCEDURE — 0240U HCHG SARS-COV-2 COVID-19 NFCT DS RESP RNA 3 TRGT MIC: CPT

## 2022-11-14 PROCEDURE — 85610 PROTHROMBIN TIME: CPT

## 2022-11-14 PROCEDURE — 85025 COMPLETE CBC W/AUTO DIFF WBC: CPT

## 2022-11-14 PROCEDURE — 700105 HCHG RX REV CODE 258: Performed by: STUDENT IN AN ORGANIZED HEALTH CARE EDUCATION/TRAINING PROGRAM

## 2022-11-14 PROCEDURE — 82728 ASSAY OF FERRITIN: CPT

## 2022-11-14 PROCEDURE — 700111 HCHG RX REV CODE 636 W/ 250 OVERRIDE (IP): Performed by: NURSE PRACTITIONER

## 2022-11-14 PROCEDURE — 93312 ECHO TRANSESOPHAGEAL: CPT

## 2022-11-14 PROCEDURE — 700111 HCHG RX REV CODE 636 W/ 250 OVERRIDE (IP): Performed by: INTERNAL MEDICINE

## 2022-11-14 PROCEDURE — 36415 COLL VENOUS BLD VENIPUNCTURE: CPT

## 2022-11-14 PROCEDURE — 83550 IRON BINDING TEST: CPT

## 2022-11-14 PROCEDURE — 01922 ANES N-INVAS IMG/RADJ THER: CPT | Performed by: STUDENT IN AN ORGANIZED HEALTH CARE EDUCATION/TRAINING PROGRAM

## 2022-11-14 PROCEDURE — A9270 NON-COVERED ITEM OR SERVICE: HCPCS | Performed by: STUDENT IN AN ORGANIZED HEALTH CARE EDUCATION/TRAINING PROGRAM

## 2022-11-14 PROCEDURE — 85730 THROMBOPLASTIN TIME PARTIAL: CPT

## 2022-11-14 PROCEDURE — 700111 HCHG RX REV CODE 636 W/ 250 OVERRIDE (IP): Performed by: STUDENT IN AN ORGANIZED HEALTH CARE EDUCATION/TRAINING PROGRAM

## 2022-11-14 PROCEDURE — A9270 NON-COVERED ITEM OR SERVICE: HCPCS | Performed by: INTERNAL MEDICINE

## 2022-11-14 PROCEDURE — 80053 COMPREHEN METABOLIC PANEL: CPT

## 2022-11-14 PROCEDURE — 160035 HCHG PACU - 1ST 60 MINS PHASE I

## 2022-11-14 PROCEDURE — 700105 HCHG RX REV CODE 258: Performed by: INTERNAL MEDICINE

## 2022-11-14 PROCEDURE — 82746 ASSAY OF FOLIC ACID SERUM: CPT

## 2022-11-14 PROCEDURE — 85520 HEPARIN ASSAY: CPT

## 2022-11-14 PROCEDURE — 82607 VITAMIN B-12: CPT

## 2022-11-14 PROCEDURE — A9270 NON-COVERED ITEM OR SERVICE: HCPCS

## 2022-11-14 PROCEDURE — 700102 HCHG RX REV CODE 250 W/ 637 OVERRIDE(OP): Performed by: INTERNAL MEDICINE

## 2022-11-14 PROCEDURE — 99233 SBSQ HOSP IP/OBS HIGH 50: CPT | Mod: FS | Performed by: INTERNAL MEDICINE

## 2022-11-14 PROCEDURE — 83540 ASSAY OF IRON: CPT

## 2022-11-14 RX ORDER — HEPARIN SODIUM 1000 [USP'U]/ML
40 INJECTION, SOLUTION INTRAVENOUS; SUBCUTANEOUS PRN
Status: DISCONTINUED | OUTPATIENT
Start: 2022-11-14 | End: 2022-11-15

## 2022-11-14 RX ORDER — GAUZE BANDAGE 2" X 2"
100 BANDAGE TOPICAL 3 TIMES DAILY
Status: DISCONTINUED | OUTPATIENT
Start: 2022-11-14 | End: 2022-11-18 | Stop reason: HOSPADM

## 2022-11-14 RX ORDER — HEPARIN SODIUM 5000 [USP'U]/100ML
0-30 INJECTION, SOLUTION INTRAVENOUS CONTINUOUS
Status: DISCONTINUED | OUTPATIENT
Start: 2022-11-14 | End: 2022-11-15

## 2022-11-14 RX ORDER — LIDOCAINE HYDROCHLORIDE 20 MG/ML
INJECTION, SOLUTION EPIDURAL; INFILTRATION; INTRACAUDAL; PERINEURAL PRN
Status: DISCONTINUED | OUTPATIENT
Start: 2022-11-14 | End: 2022-11-14 | Stop reason: SURG

## 2022-11-14 RX ORDER — PHENYLEPHRINE HYDROCHLORIDE 10 MG/ML
INJECTION, SOLUTION INTRAMUSCULAR; INTRAVENOUS; SUBCUTANEOUS PRN
Status: DISCONTINUED | OUTPATIENT
Start: 2022-11-14 | End: 2022-11-14 | Stop reason: SURG

## 2022-11-14 RX ORDER — HEPARIN SODIUM 1000 [USP'U]/ML
80 INJECTION, SOLUTION INTRAVENOUS; SUBCUTANEOUS ONCE
Status: COMPLETED | OUTPATIENT
Start: 2022-11-14 | End: 2022-11-14

## 2022-11-14 RX ORDER — GABAPENTIN 100 MG/1
100 CAPSULE ORAL 3 TIMES DAILY
Status: DISCONTINUED | OUTPATIENT
Start: 2022-11-14 | End: 2022-11-18 | Stop reason: HOSPADM

## 2022-11-14 RX ORDER — SODIUM CHLORIDE 9 MG/ML
INJECTION, SOLUTION INTRAVENOUS
Status: DISCONTINUED | OUTPATIENT
Start: 2022-11-14 | End: 2022-11-14 | Stop reason: SURG

## 2022-11-14 RX ORDER — ONDANSETRON 2 MG/ML
4 INJECTION INTRAMUSCULAR; INTRAVENOUS
Status: DISCONTINUED | OUTPATIENT
Start: 2022-11-14 | End: 2022-11-14 | Stop reason: HOSPADM

## 2022-11-14 RX ADMIN — LISINOPRIL 5 MG: 5 TABLET ORAL at 06:42

## 2022-11-14 RX ADMIN — TRAMADOL HYDROCHLORIDE 50 MG: 50 TABLET, COATED ORAL at 20:39

## 2022-11-14 RX ADMIN — GABAPENTIN 100 MG: 100 CAPSULE ORAL at 16:43

## 2022-11-14 RX ADMIN — ACETAMINOPHEN 325 MG: 325 TABLET, FILM COATED ORAL at 01:56

## 2022-11-14 RX ADMIN — EPHEDRINE SULFATE 10 MG: 50 INJECTION INTRAMUSCULAR; INTRAVENOUS; SUBCUTANEOUS at 10:08

## 2022-11-14 RX ADMIN — FUROSEMIDE 20 MG: 20 TABLET ORAL at 04:09

## 2022-11-14 RX ADMIN — PHENYLEPHRINE HYDROCHLORIDE 200 MCG: 10 INJECTION INTRAVENOUS at 10:12

## 2022-11-14 RX ADMIN — GABAPENTIN 100 MG: 100 CAPSULE ORAL at 14:24

## 2022-11-14 RX ADMIN — METOPROLOL TARTRATE 12.5 MG: 25 TABLET, FILM COATED ORAL at 04:10

## 2022-11-14 RX ADMIN — LIDOCAINE HYDROCHLORIDE 60 MG: 20 INJECTION, SOLUTION EPIDURAL; INFILTRATION; INTRACAUDAL at 09:49

## 2022-11-14 RX ADMIN — HEPARIN SODIUM 4800 UNITS: 1000 INJECTION, SOLUTION INTRAVENOUS; SUBCUTANEOUS at 14:21

## 2022-11-14 RX ADMIN — ACETAMINOPHEN 325 MG: 325 TABLET, FILM COATED ORAL at 08:25

## 2022-11-14 RX ADMIN — PROPOFOL 40 MG: 10 INJECTION, EMULSION INTRAVENOUS at 09:49

## 2022-11-14 RX ADMIN — PHENYLEPHRINE HYDROCHLORIDE 200 MCG: 10 INJECTION INTRAVENOUS at 10:08

## 2022-11-14 RX ADMIN — THIAMINE HYDROCHLORIDE 100 MG: 100 INJECTION, SOLUTION INTRAMUSCULAR; INTRAVENOUS at 06:49

## 2022-11-14 RX ADMIN — FUROSEMIDE 20 MG: 20 TABLET ORAL at 16:43

## 2022-11-14 RX ADMIN — SODIUM CHLORIDE: 9 INJECTION, SOLUTION INTRAVENOUS at 09:47

## 2022-11-14 RX ADMIN — LEVOTHYROXINE SODIUM 125 MCG: 0.12 TABLET ORAL at 04:09

## 2022-11-14 RX ADMIN — Medication 100 MG: at 14:23

## 2022-11-14 RX ADMIN — TRAMADOL HYDROCHLORIDE 50 MG: 50 TABLET, COATED ORAL at 01:55

## 2022-11-14 RX ADMIN — CLOPIDOGREL BISULFATE 75 MG: 75 TABLET ORAL at 04:09

## 2022-11-14 RX ADMIN — Medication 10 MG: at 20:39

## 2022-11-14 RX ADMIN — Medication 100 MG: at 20:39

## 2022-11-14 RX ADMIN — ATORVASTATIN CALCIUM 40 MG: 40 TABLET, FILM COATED ORAL at 16:42

## 2022-11-14 RX ADMIN — ACETAMINOPHEN 325 MG: 325 TABLET, FILM COATED ORAL at 20:40

## 2022-11-14 RX ADMIN — PHENYLEPHRINE HYDROCHLORIDE 100 MCG: 10 INJECTION INTRAVENOUS at 10:05

## 2022-11-14 RX ADMIN — EPHEDRINE SULFATE 10 MG: 50 INJECTION INTRAMUSCULAR; INTRAVENOUS; SUBCUTANEOUS at 10:01

## 2022-11-14 RX ADMIN — EPHEDRINE SULFATE 10 MG: 50 INJECTION INTRAMUSCULAR; INTRAVENOUS; SUBCUTANEOUS at 10:05

## 2022-11-14 RX ADMIN — HEPARIN SODIUM 18 UNITS/KG/HR: 5000 INJECTION, SOLUTION INTRAVENOUS at 14:16

## 2022-11-14 ASSESSMENT — COGNITIVE AND FUNCTIONAL STATUS - GENERAL
PERSONAL GROOMING: A LITTLE
MOVING FROM LYING ON BACK TO SITTING ON SIDE OF FLAT BED: A LITTLE
MOVING FROM LYING ON BACK TO SITTING ON SIDE OF FLAT BED: A LITTLE
DRESSING REGULAR LOWER BODY CLOTHING: A LITTLE
SUGGESTED CMS G CODE MODIFIER MOBILITY: CJ
WALKING IN HOSPITAL ROOM: A LITTLE
HELP NEEDED FOR BATHING: A LITTLE
DAILY ACTIVITIY SCORE: 19
STANDING UP FROM CHAIR USING ARMS: A LITTLE
MOBILITY SCORE: 20
CLIMB 3 TO 5 STEPS WITH RAILING: A LITTLE
DRESSING REGULAR LOWER BODY CLOTHING: A LITTLE
HELP NEEDED FOR BATHING: A LITTLE
MOVING TO AND FROM BED TO CHAIR: A LITTLE
TURNING FROM BACK TO SIDE WHILE IN FLAT BAD: A LITTLE
PERSONAL GROOMING: A LITTLE
DRESSING REGULAR UPPER BODY CLOTHING: A LITTLE
CLIMB 3 TO 5 STEPS WITH RAILING: A LITTLE
MOBILITY SCORE: 20
DRESSING REGULAR UPPER BODY CLOTHING: A LITTLE
SUGGESTED CMS G CODE MODIFIER MOBILITY: CJ
TOILETING: A LITTLE
SUGGESTED CMS G CODE MODIFIER DAILY ACTIVITY: CK

## 2022-11-14 ASSESSMENT — LIFESTYLE VARIABLES
EVER HAD A DRINK FIRST THING IN THE MORNING TO STEADY YOUR NERVES TO GET RID OF A HANGOVER: YES
DOES PATIENT WANT TO STOP DRINKING: YES
HOW MANY TIMES IN THE PAST YEAR HAVE YOU HAD 5 OR MORE DRINKS IN A DAY: 1
ALCOHOL_USE: YES
TOTAL SCORE: 4
CONSUMPTION TOTAL: POSITIVE
ON A TYPICAL DAY WHEN YOU DRINK ALCOHOL HOW MANY DRINKS DO YOU HAVE: 3
DOES PATIENT WANT TO TALK TO SOMEONE ABOUT QUITTING: YES
HAVE PEOPLE ANNOYED YOU BY CRITICIZING YOUR DRINKING: YES
EVER FELT BAD OR GUILTY ABOUT YOUR DRINKING: YES
AVERAGE NUMBER OF DAYS PER WEEK YOU HAVE A DRINK CONTAINING ALCOHOL: 4
HAVE YOU EVER FELT YOU SHOULD CUT DOWN ON YOUR DRINKING: YES

## 2022-11-14 ASSESSMENT — ENCOUNTER SYMPTOMS
STRIDOR: 0
HEADACHES: 0
CHEST TIGHTNESS: 1
WHEEZING: 0
CHILLS: 0
CONSTIPATION: 0
FEVER: 0
COUGH: 0
SHORTNESS OF BREATH: 1
NAUSEA: 0
ABDOMINAL PAIN: 0
PALPITATIONS: 0
MYALGIAS: 0
DIARRHEA: 0
SHORTNESS OF BREATH: 0
DIZZINESS: 0
CHOKING: 0
APNEA: 0

## 2022-11-14 ASSESSMENT — FIBROSIS 4 INDEX: FIB4 SCORE: 2.96

## 2022-11-14 ASSESSMENT — PAIN DESCRIPTION - PAIN TYPE
TYPE: ACUTE PAIN

## 2022-11-14 ASSESSMENT — PATIENT HEALTH QUESTIONNAIRE - PHQ9
1. LITTLE INTEREST OR PLEASURE IN DOING THINGS: NOT AT ALL
2. FEELING DOWN, DEPRESSED, IRRITABLE, OR HOPELESS: NOT AT ALL
SUM OF ALL RESPONSES TO PHQ9 QUESTIONS 1 AND 2: 0

## 2022-11-14 NOTE — ASSESSMENT & PLAN NOTE
Binged drinking >5 drinks 2 weeks ago, last drink 1 beer 2 days ago  -seizure precaution  -5 days hospitalization without withdrawal symptoms

## 2022-11-14 NOTE — CARE PLAN
The patient is Stable - Low risk of patient condition declining or worsening    Shift Goals  Clinical Goals: control pain, monitor vitals, labs  Patient Goals: control pain    Progress made toward(s) clinical / shift goals:  yes    Patient is not progressing towards the following goals:n/a    Problem: Pain - Standard  Goal: Alleviation of pain or a reduction in pain to the patient’s comfort goal  Outcome: Progressing     Problem: Knowledge Deficit - Standard  Goal: Patient and family/care givers will demonstrate understanding of plan of care, disease process/condition, diagnostic tests and medications  Outcome: Progressing     Problem: Psychosocial  Goal: Patient's level of anxiety will decrease  Outcome: Progressing

## 2022-11-14 NOTE — CARE PLAN
The patient is Stable - Low risk of patient condition declining or worsening    Shift Goals  Clinical Goals: manage pain, monitor vitals, monitor labs  Patient Goals: rest, comfort, pain control    Progress made toward(s) clinical / shift goals:  yes    Patient is not progressing towards the following goals:    Problem: Pain - Standard  Goal: Alleviation of pain or a reduction in pain to the patient’s comfort goal  Outcome: Progressing     Problem: Knowledge Deficit - Standard  Goal: Patient and family/care givers will demonstrate understanding of plan of care, disease process/condition, diagnostic tests and medications  Outcome: Progressing     Problem: Psychosocial  Goal: Patient's level of anxiety will decrease  Outcome: Progressing     Problem: Communication  Goal: The ability to communicate needs accurately and effectively will improve  Outcome: Progressing

## 2022-11-14 NOTE — PROGRESS NOTES
Monitor summary:        Rhythm: SR   Rate: 64-83  Ectopy: (f)PVC, (r)COUP, Measurements:.23./.09/.36        12hr chart check

## 2022-11-14 NOTE — PROGRESS NOTES
"Cardiology Follow Up Progress Note    Date of Service  11/14/2022    Attending Physician  Harjit Noonan M.D.    Chief Complaint   Chest Pain    HPI  Kristopher Leroy is a 46 y.o. male admitted 11/12/2022 with per Dr. Roca, \"with history of valve replacement at [facility] (2015, previously replaced two times in [facility] in 2001 and 2011), CAD (patient reports prior MI and possible CABG), HTN, dyslipidemia, and Guillian Warren who presented with chest pain. Cardiology is consulted for NSTEMI.     The patient endorses prior distant MI and thinks he had bypass surgery but no stents.  The patient reports having valve replaced a total of 3 times, but does not know which valve.  Unfortunately, the records are being requested, and not available at this time.      The patient is travelling to see his sister, he is from [facility].  He started having chest pain yesterday when he was walking to catch a train.  He reports associated shortness of breath.  He denies any orthopnea, PND, or leg swelling. No palpitations.  No syncope or presyncopal episodes.\"     Interim Events  11/14/2022: No overnight cardiac events. Patient continues to report chest pain. Tele monitoring personally interpreted by me shows SR. VSS; RA. Labs reviewed. NPO this AM for CARLOS. Records requested from [facility] and [place]    Review of Systems  Review of Systems   Respiratory:  Positive for chest tightness and shortness of breath. Negative for apnea, cough, choking, wheezing and stridor.    Cardiovascular:  Positive for chest pain. Negative for palpitations and leg swelling.     Vital signs in last 24 hours  Temp:  [36.2 °C (97.2 °F)-36.8 °C (98.2 °F)] 36.2 °C (97.2 °F)  Pulse:  [68-91] 68  Resp:  [16-18] 16  BP: (105-135)/(61-80) 109/61  SpO2:  [91 %-99 %] 99 %    Physical Exam  Physical Exam  Vitals and nursing note reviewed.   Constitutional:       General: He is not in acute distress.  Cardiovascular:      Rate and Rhythm: Normal rate and " regular rhythm.      Pulses: Normal pulses.      Heart sounds: Murmur heard.   Pulmonary:      Effort: Pulmonary effort is normal. No respiratory distress.      Breath sounds: Normal breath sounds.   Abdominal:      General: Bowel sounds are normal. There is no distension.      Palpations: Abdomen is soft.   Musculoskeletal:         General: No swelling.      Cervical back: Normal range of motion.      Right lower leg: No edema.      Left lower leg: No edema.   Skin:     General: Skin is warm and dry.   Neurological:      General: No focal deficit present.      Mental Status: He is alert and oriented to person, place, and time. Mental status is at baseline.   Psychiatric:         Mood and Affect: Mood normal.         Behavior: Behavior normal.       Lab Review  Lab Results   Component Value Date/Time    WBC 5.0 11/14/2022 02:12 AM    RBC 3.53 (L) 11/14/2022 02:12 AM    HEMOGLOBIN 12.5 (L) 11/14/2022 02:12 AM    HEMATOCRIT 37.1 (L) 11/14/2022 02:12 AM    .1 (H) 11/14/2022 02:12 AM    MCH 35.4 (H) 11/14/2022 02:12 AM    MCHC 33.7 11/14/2022 02:12 AM    MPV 10.5 11/14/2022 02:12 AM      Lab Results   Component Value Date/Time    SODIUM 137 11/14/2022 02:12 AM    POTASSIUM 4.5 11/14/2022 02:12 AM    CHLORIDE 103 11/14/2022 02:12 AM    CO2 22 11/14/2022 02:12 AM    GLUCOSE 112 (H) 11/14/2022 02:12 AM    BUN 23 (H) 11/14/2022 02:12 AM    CREATININE 0.80 11/14/2022 02:12 AM      Lab Results   Component Value Date/Time    ASTSGOT 58 (H) 11/14/2022 02:12 AM    ALTSGPT 69 (H) 11/14/2022 02:12 AM     Lab Results   Component Value Date/Time    CHOLSTRLTOT 114 11/13/2022 04:00 AM    LDL 51 11/13/2022 04:00 AM    HDL 57 11/13/2022 04:00 AM    TRIGLYCERIDE 31 11/13/2022 04:00 AM    TROPONINT 57 (H) 11/13/2022 09:45 AM       No results for input(s): NTPROBNP in the last 72 hours.    Cardiac Imaging and Procedures Review  EKG:  My personal interpretation of the EKG dated 11/13/2022 is SR with first degree AV block.      Echocardiogram (11/13/2022):  No prior study is available for comparison.   The left ventricle is moderately dilated.  Severely reduced left ventricular systolic function.  The left ventricular ejection fraction is visually estimated to be 20%   to 30%.  Grade III diastolic dysfunction (restrictive pattern).  Moderately dilated right ventricle.  Reduced right ventricular systolic function.  Mild mitral stenosis.  Dysfunctional bioprosthetic aortic valve with moderate aortic valve   regurgitation. Pressure half time is 332 msec.  Moderate tricuspid regurgitation.  Right ventricular systolic pressure is estimated to be 40 mmHg.  Normal inferior vena cava size and inspiratory collapse.    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  HEMODYNAMICS              Ao: 86/61 mmHg              LVEDP: 16 mmHg              Gradient on LV pullback: Yes,      II. CORONARY ANGIOGRAPHY:  Left main coronary artery: Moderate caliber bifurcating no CAD  Left anterior descending artery: Large caliber wrapping around the apex and supplying the apical two thirds of the inferior wall.  Left circumflex coronary artery: Large caliber and dominant chronic totally occluded at its most distal portion which receives well-developed collaterals from the right system.  No grafts identified.  Right coronary artery: High anterior takeoff small to moderate caliber vessel supplying a collateral  to the distal circumflex.     III.  AORTOGRAM:  Normal caliber thoracic aorta    Imaging  Chest X-Ray (11/12/2022): Mildly enlarged cardiac silhouette with vascular congestion.     Assessment/Plan  CAD  -LCX  with collaterals  -Continue home asa and plavix  -Continue high intensity statin  -Continue metoprolol 12.5 mg twice daily  -Request records from [facility] and [facility]     Prior valve replacement   -Awaiting records for history  -NPO for CARLOS today  -PO lasix 20 mg BID     Hypertension  -Continue metoprolol as above  -Continue lisinopril 5 mg daily    Thank you for allowing me to participate in the care of this patient.  I will continue to follow this patient    Please contact me with any questions.    Please see Dr. Arndt's attestation for further details and MDM.     I personally spent a total of 10 minutes which includes face-to-face time and non-face-to-face time spent on preparing to see the patient, reviewing hospital notes and tests, obtaining history from the patient, performing a medically appropriate exam, counseling and educating the patient, ordering medications/tests/procedures/referrals as clinically indicated, and documenting information in the electronic medical record.    MARILY Feldman.   [facility]  [phone]

## 2022-11-14 NOTE — PROGRESS NOTES
Bedside report received from NOC RN. Assumed care of pt. Pt awake, laying in bed. A/Ox4, VSS. No concerns, complaints or distress. Pt educated to call before getting out of bed. POC reviewed and white board updated. Tele box on. SR 73 on the monitor. Call light in reach. Bed locked in lowest position with 2 upper bed rails up. Bed alarm on.

## 2022-11-14 NOTE — ANESTHESIA POSTPROCEDURE EVALUATION
Patient: Kristopher Leroy    Procedure Summary     Date: 11/14/22 Room / Location: [facility] IMAGING - ECHOCARDIOLOGY [facility]    Anesthesia Start: 0947 Anesthesia Stop: 1036    Procedure: EC-CARLOS W/O CONT Diagnosis:       NSTEMI (non-ST elevated myocardial infarction) ([facility])      NSTEMI (non-ST elevated myocardial infarction) ([facility])    Scheduled Providers: Robbin Arndt M.D. Responsible Provider: Hector Manuel M.D.    Anesthesia Type: MAC ASA Status: 4          Final Anesthesia Type: MAC  Last vitals  BP   Blood Pressure: 111/55    Temp   36.7 °C (98 °F)    Pulse   66   Resp   15    SpO2   96 %      Anesthesia Post Evaluation    Patient location during evaluation: PACU  Patient participation: complete - patient participated  Level of consciousness: awake and alert    Airway patency: patent  Anesthetic complications: no  Cardiovascular status: hemodynamically stable  Respiratory status: acceptable  Hydration status: euvolemic    PONV: none          No notable events documented.     Nurse Pain Score: 0 (NPRS)

## 2022-11-14 NOTE — PROGRESS NOTES
Pt transported back to floor. Pt placed back on tele monitor. Post procedure vitals started. VSS.  Will continue to monitor.

## 2022-11-14 NOTE — OR NURSING
Awake and alert in PACU.  Denies pain/nausea. Vitals stable.  Report to floor.  Transported with ACLS RN and CCT.

## 2022-11-14 NOTE — PROGRESS NOTES
NO HARD CHART:     Patient to procedure Room for CARLOS with no hard chart, only loose papers. Per Floor RN, no hard chart available. Procedure RN completed Pre-Procedure Consent paperwork packet, including: WHO Yellow Sheet signed by RN and MD, Informed consent for CARLOS procedure signed by RN, patient, and Cardiology, and Informed consent for Anesthesia signed by Anesthesia MD and patient. Procedure Packet secured to loose papers and hand delivered to receiving PACU RN who acknowledged receipt and no hard chart.

## 2022-11-14 NOTE — HOSPITAL COURSE
Mr. Kristopher Leroy is 45 yo male hx of Guillain-[place], hypothyroidism, valvular dz/CAD, hx of open heart surgery with bioprosthetic aortic valve on DAPT who presented on 11/12/2022 with chest pain. Patient lives in [place] and was traveling to [place] via train and stopped here due to chest pain, pressure that increased with movement and SOB, non-radiating. He takes aspirin, plavix, lasix for heart and valve disease. EKG with T inversion, troponin elevated 70s now down trended to steady in 50s. Repeat EKG throughout the day 11/13, no changes seen with concern for STEMI although chest pain consistently 9-10/10. Cardiology consulted, Dr. Roca evaluated patient and he underwent cath with results no concerns for ACS, recs for reevaluation of bioprosthetic aortic valve dysfunction. TTE with reduced EF 20%. CARLOS with concerns for bioprosthetic MV/AV dysfunction, left atrial appendage thrombus now on heparin gtt, and echogenic material on MV concern for endocarditis. Recommendation from cardiothoracic surgery and cardiology for f/u with [facility] for further management for valves, continue with plavix w/o aspirin and bridge heparin to warfarin. Endocarditis workup with procal negative and BCX NGTD. Anticoagulation switch to Apixaban. Patient was discharged with cardiology recommendation to follow-up with [facility] for further management. Cardiothoracic surgery did not recommend a 4th time redo of aortic and mitral valve replacements as the patient had EF 10-15% with little improvement with dobutamine. They felt that he would not tolerate the procedure with a STS mortality risk of 10% with recommendation of medical management and following up with higher level of care.

## 2022-11-14 NOTE — ANESTHESIA PREPROCEDURE EVALUATION
Date/Time: 11/14/22 0930    Scheduled providers: Robbin Arndt M.D.    Procedure: EC-CARLOS W/O CONT    Diagnosis:       NSTEMI (non-ST elevated myocardial infarction) (HCC) [I21.4]      NSTEMI (non-ST elevated myocardial infarction) (HCC) [I21.4]    Location: [facility] IMAGING - ECHOCARDIOLOGY [facility]        47 yo M w/ hx of Guillain [place], hypothyroidism, CAD s/p CABG and AV replacement, admitted on 11/12/22 w/ chest pain. ECHO on 11/13/22 showed EF 20% and Grade 3 DD. Had cardiac cath yesterday moderate AR and bioprosthetic aortic valve dysfunction; no ACS. Pt is NPO.  Relevant Problems   CARDIAC   (positive) NSTEMI (non-ST elevated myocardial infarction) (HCC)      ENDO   (positive) Hyperthyroidism       Physical Exam    Airway   Mallampati: II  TM distance: >3 FB  Neck ROM: full       Cardiovascular - normal exam  Rhythm: regular  Rate: normal  (-) murmur     Dental - normal exam           Pulmonary - normal exam  Breath sounds clear to auscultation     Abdominal    Neurological - normal exam                 Anesthesia Plan    ASA 4   ASA physical status 4 criteria: severe reduction of ejection fractions    Plan - MAC               Induction: intravenous    Postoperative Plan: Postoperative administration of opioids is intended.    Pertinent diagnostic labs and testing reviewed    Informed Consent:    Anesthetic plan and risks discussed with patient.    Use of blood products discussed with: patient whom consented to blood products.

## 2022-11-14 NOTE — CARE PLAN
Problem: Communication  Goal: The ability to communicate needs accurately and effectively will improve  Outcome: Progressing     Problem: Hemodynamics  Goal: Patient's hemodynamics, fluid balance and neurologic status will be stable or improve  Outcome: Progressing   The patient is Watcher - Medium risk of patient condition declining or worsening    Shift Goals  Clinical Goals: control pain, monitor vitals  Patient Goals: rest; pain control    Progress made toward(s) clinical / shift goals:  Hemodynamics have been stable, able to communicate needs accurately and effectively to staff    Patient is not progressing towards the following goals:pain has not been controlled

## 2022-11-14 NOTE — ASSESSMENT & PLAN NOTE
RESOLVED  Per patient, no chest pain   History of aortic bioprosthetic valve replacement on DAPT, per patient replaced three times   Hx of pacemaker placement with infection requiring removal, has a loop recorder implanted  EKG showed T inversion, no prior records to compare if new. CARLOS EF 20% with concerns for bioprosthetic MV/AV dysfunction, left atrial appendage thrombus, and echogenic material on MV concern for endocarditis.  -Cardiology following, apprec recs, CTS was consulted for valvular dysfunction: Recommendation for f/u with [facility] for further management for valves, continue with plavix w/o aspirin, anticoagulation with apixaban. Endocarditis workup with procal negative and BCX NGTD.  -continue plavix 75mg  -metoprolol SR 25mg  -lisinopril 5mg  -atorvastatin 80mg  -lasix 20mg BID  -Apixaban

## 2022-11-15 LAB
ALBUMIN SERPL BCP-MCNC: 4.3 G/DL (ref 3.2–4.9)
ALBUMIN/GLOB SERPL: 2 G/DL
ALP SERPL-CCNC: 105 U/L (ref 30–99)
ALT SERPL-CCNC: 59 U/L (ref 2–50)
AMPHET UR QL SCN: NEGATIVE
ANION GAP SERPL CALC-SCNC: 9 MMOL/L (ref 7–16)
AST SERPL-CCNC: 43 U/L (ref 12–45)
BARBITURATES UR QL SCN: NEGATIVE
BASOPHILS # BLD AUTO: 0 % (ref 0–1.8)
BASOPHILS # BLD: 0 K/UL (ref 0–0.12)
BENZODIAZ UR QL SCN: NEGATIVE
BILIRUB SERPL-MCNC: 0.5 MG/DL (ref 0.1–1.5)
BUN SERPL-MCNC: 19 MG/DL (ref 8–22)
BZE UR QL SCN: NEGATIVE
CALCIUM SERPL-MCNC: 8.6 MG/DL (ref 8.5–10.5)
CANNABINOIDS UR QL SCN: POSITIVE
CHLORIDE SERPL-SCNC: 104 MMOL/L (ref 96–112)
CO2 SERPL-SCNC: 26 MMOL/L (ref 20–33)
CREAT SERPL-MCNC: 0.67 MG/DL (ref 0.5–1.4)
EOSINOPHIL # BLD AUTO: 0 K/UL (ref 0–0.51)
EOSINOPHIL NFR BLD: 0 % (ref 0–6.9)
ERYTHROCYTE [DISTWIDTH] IN BLOOD BY AUTOMATED COUNT: 51.9 FL (ref 35.9–50)
GFR SERPLBLD CREATININE-BSD FMLA CKD-EPI: 115 ML/MIN/1.73 M 2
GLOBULIN SER CALC-MCNC: 2.1 G/DL (ref 1.9–3.5)
GLUCOSE SERPL-MCNC: 108 MG/DL (ref 65–99)
HCT VFR BLD AUTO: 34.2 % (ref 42–52)
HGB BLD-MCNC: 11.5 G/DL (ref 14–18)
IMM GRANULOCYTES # BLD AUTO: 0.01 K/UL (ref 0–0.11)
IMM GRANULOCYTES NFR BLD AUTO: 0.3 % (ref 0–0.9)
LYMPHOCYTES # BLD AUTO: 0.56 K/UL (ref 1–4.8)
LYMPHOCYTES NFR BLD: 14.3 % (ref 22–41)
MAGNESIUM SERPL-MCNC: 2 MG/DL (ref 1.5–2.5)
MCH RBC QN AUTO: 35.3 PG (ref 27–33)
MCHC RBC AUTO-ENTMCNC: 33.6 G/DL (ref 33.7–35.3)
MCV RBC AUTO: 104.9 FL (ref 81.4–97.8)
METHADONE UR QL SCN: NEGATIVE
MONOCYTES # BLD AUTO: 0.34 K/UL (ref 0–0.85)
MONOCYTES NFR BLD AUTO: 8.7 % (ref 0–13.4)
NEUTROPHILS # BLD AUTO: 3.01 K/UL (ref 1.82–7.42)
NEUTROPHILS NFR BLD: 76.7 % (ref 44–72)
NRBC # BLD AUTO: 0 K/UL
NRBC BLD-RTO: 0 /100 WBC
OPIATES UR QL SCN: NEGATIVE
OXYCODONE UR QL SCN: NEGATIVE
PCP UR QL SCN: NEGATIVE
PHOSPHATE SERPL-MCNC: 4.5 MG/DL (ref 2.5–4.5)
PLATELET # BLD AUTO: 88 K/UL (ref 164–446)
PMV BLD AUTO: 10.5 FL (ref 9–12.9)
POTASSIUM SERPL-SCNC: 4 MMOL/L (ref 3.6–5.5)
PROCALCITONIN SERPL-MCNC: <0.05 NG/ML
PROPOXYPH UR QL SCN: NEGATIVE
PROT SERPL-MCNC: 6.4 G/DL (ref 6–8.2)
RBC # BLD AUTO: 3.26 M/UL (ref 4.7–6.1)
SODIUM SERPL-SCNC: 139 MMOL/L (ref 135–145)
UFH PPP CHRO-ACNC: 0.45 IU/ML
WBC # BLD AUTO: 3.9 K/UL (ref 4.8–10.8)

## 2022-11-15 PROCEDURE — 700102 HCHG RX REV CODE 250 W/ 637 OVERRIDE(OP): Performed by: INTERNAL MEDICINE

## 2022-11-15 PROCEDURE — 700111 HCHG RX REV CODE 636 W/ 250 OVERRIDE (IP): Performed by: INTERNAL MEDICINE

## 2022-11-15 PROCEDURE — A9270 NON-COVERED ITEM OR SERVICE: HCPCS | Performed by: STUDENT IN AN ORGANIZED HEALTH CARE EDUCATION/TRAINING PROGRAM

## 2022-11-15 PROCEDURE — 85520 HEPARIN ASSAY: CPT

## 2022-11-15 PROCEDURE — 11055 PARING/CUTG B9 HYPRKER LES 1: CPT

## 2022-11-15 PROCEDURE — A9270 NON-COVERED ITEM OR SERVICE: HCPCS | Performed by: INTERNAL MEDICINE

## 2022-11-15 PROCEDURE — 99233 SBSQ HOSP IP/OBS HIGH 50: CPT | Mod: FS | Performed by: INTERNAL MEDICINE

## 2022-11-15 PROCEDURE — 700101 HCHG RX REV CODE 250

## 2022-11-15 PROCEDURE — 770020 HCHG ROOM/CARE - TELE (206)

## 2022-11-15 PROCEDURE — 80053 COMPREHEN METABOLIC PANEL: CPT

## 2022-11-15 PROCEDURE — 700102 HCHG RX REV CODE 250 W/ 637 OVERRIDE(OP): Performed by: NURSE PRACTITIONER

## 2022-11-15 PROCEDURE — 99255 IP/OBS CONSLTJ NEW/EST HI 80: CPT | Performed by: THORACIC SURGERY (CARDIOTHORACIC VASCULAR SURGERY)

## 2022-11-15 PROCEDURE — A9270 NON-COVERED ITEM OR SERVICE: HCPCS

## 2022-11-15 PROCEDURE — 700111 HCHG RX REV CODE 636 W/ 250 OVERRIDE (IP): Performed by: NURSE PRACTITIONER

## 2022-11-15 PROCEDURE — 85025 COMPLETE CBC W/AUTO DIFF WBC: CPT

## 2022-11-15 PROCEDURE — 81003 URINALYSIS AUTO W/O SCOPE: CPT

## 2022-11-15 PROCEDURE — A9270 NON-COVERED ITEM OR SERVICE: HCPCS | Performed by: NURSE PRACTITIONER

## 2022-11-15 PROCEDURE — 700102 HCHG RX REV CODE 250 W/ 637 OVERRIDE(OP): Performed by: STUDENT IN AN ORGANIZED HEALTH CARE EDUCATION/TRAINING PROGRAM

## 2022-11-15 PROCEDURE — 99232 SBSQ HOSP IP/OBS MODERATE 35: CPT | Mod: GC | Performed by: INTERNAL MEDICINE

## 2022-11-15 PROCEDURE — 700102 HCHG RX REV CODE 250 W/ 637 OVERRIDE(OP)

## 2022-11-15 PROCEDURE — 84100 ASSAY OF PHOSPHORUS: CPT

## 2022-11-15 PROCEDURE — 83735 ASSAY OF MAGNESIUM: CPT

## 2022-11-15 PROCEDURE — 80307 DRUG TEST PRSMV CHEM ANLYZR: CPT

## 2022-11-15 PROCEDURE — 84145 PROCALCITONIN (PCT): CPT

## 2022-11-15 PROCEDURE — 36415 COLL VENOUS BLD VENIPUNCTURE: CPT

## 2022-11-15 RX ORDER — METOPROLOL SUCCINATE 25 MG/1
25 TABLET, EXTENDED RELEASE ORAL
Status: DISCONTINUED | OUTPATIENT
Start: 2022-11-15 | End: 2022-11-18 | Stop reason: HOSPADM

## 2022-11-15 RX ORDER — SPIRONOLACTONE 25 MG/1
12.5 TABLET ORAL
Status: DISCONTINUED | OUTPATIENT
Start: 2022-11-15 | End: 2022-11-18 | Stop reason: HOSPADM

## 2022-11-15 RX ORDER — METOPROLOL SUCCINATE 25 MG/1
25 TABLET, EXTENDED RELEASE ORAL
Status: DISCONTINUED | OUTPATIENT
Start: 2022-11-15 | End: 2022-11-15

## 2022-11-15 RX ORDER — WARFARIN SODIUM 5 MG/1
5 TABLET ORAL DAILY
Status: DISCONTINUED | OUTPATIENT
Start: 2022-11-15 | End: 2022-11-17

## 2022-11-15 RX ORDER — ENOXAPARIN SODIUM 100 MG/ML
60 INJECTION SUBCUTANEOUS EVERY 12 HOURS
Status: DISCONTINUED | OUTPATIENT
Start: 2022-11-15 | End: 2022-11-17

## 2022-11-15 RX ORDER — ATORVASTATIN CALCIUM 80 MG/1
80 TABLET, FILM COATED ORAL EVERY EVENING
Status: DISCONTINUED | OUTPATIENT
Start: 2022-11-15 | End: 2022-11-18 | Stop reason: HOSPADM

## 2022-11-15 RX ADMIN — ACETAMINOPHEN 325 MG: 325 TABLET, FILM COATED ORAL at 05:35

## 2022-11-15 RX ADMIN — ACETAMINOPHEN 325 MG: 325 TABLET, FILM COATED ORAL at 20:39

## 2022-11-15 RX ADMIN — HEPARIN SODIUM 18 UNITS/KG/HR: 5000 INJECTION, SOLUTION INTRAVENOUS at 13:05

## 2022-11-15 RX ADMIN — TRAMADOL HYDROCHLORIDE 50 MG: 50 TABLET, COATED ORAL at 14:11

## 2022-11-15 RX ADMIN — ATORVASTATIN CALCIUM 80 MG: 80 TABLET, FILM COATED ORAL at 17:22

## 2022-11-15 RX ADMIN — ASPIRIN 81 MG 81 MG: 81 TABLET ORAL at 05:24

## 2022-11-15 RX ADMIN — FUROSEMIDE 20 MG: 20 TABLET ORAL at 05:23

## 2022-11-15 RX ADMIN — CLOPIDOGREL BISULFATE 75 MG: 75 TABLET ORAL at 05:24

## 2022-11-15 RX ADMIN — Medication 100 MG: at 20:37

## 2022-11-15 RX ADMIN — FUROSEMIDE 20 MG: 20 TABLET ORAL at 17:22

## 2022-11-15 RX ADMIN — GABAPENTIN 100 MG: 100 CAPSULE ORAL at 12:37

## 2022-11-15 RX ADMIN — METOPROLOL TARTRATE 12.5 MG: 25 TABLET, FILM COATED ORAL at 05:23

## 2022-11-15 RX ADMIN — SENNOSIDES AND DOCUSATE SODIUM 2 TABLET: 50; 8.6 TABLET ORAL at 05:22

## 2022-11-15 RX ADMIN — WARFARIN SODIUM 5 MG: 5 TABLET ORAL at 17:21

## 2022-11-15 RX ADMIN — Medication 100 MG: at 08:42

## 2022-11-15 RX ADMIN — LEVOTHYROXINE SODIUM 125 MCG: 0.12 TABLET ORAL at 05:24

## 2022-11-15 RX ADMIN — GABAPENTIN 100 MG: 100 CAPSULE ORAL at 17:22

## 2022-11-15 RX ADMIN — METOPROLOL SUCCINATE 25 MG: 25 TABLET, EXTENDED RELEASE ORAL at 20:37

## 2022-11-15 RX ADMIN — ACETAMINOPHEN 325 MG: 325 TABLET, FILM COATED ORAL at 20:38

## 2022-11-15 RX ADMIN — GABAPENTIN 100 MG: 100 CAPSULE ORAL at 05:23

## 2022-11-15 RX ADMIN — LISINOPRIL 5 MG: 5 TABLET ORAL at 05:23

## 2022-11-15 RX ADMIN — Medication 100 MG: at 14:11

## 2022-11-15 RX ADMIN — Medication 5 MG: at 20:40

## 2022-11-15 RX ADMIN — LIDOCAINE 1 PATCH: 700 PATCH TOPICAL at 17:23

## 2022-11-15 RX ADMIN — ENOXAPARIN SODIUM 60 MG: 60 INJECTION SUBCUTANEOUS at 14:12

## 2022-11-15 RX ADMIN — SPIRONOLACTONE 12.5 MG: 25 TABLET ORAL at 14:11

## 2022-11-15 ASSESSMENT — ENCOUNTER SYMPTOMS
PSYCHIATRIC NEGATIVE: 1
FEVER: 0
MYALGIAS: 0
EYES NEGATIVE: 1
MYALGIAS: 1
SHORTNESS OF BREATH: 1
GASTROINTESTINAL NEGATIVE: 1
STRIDOR: 0
DIZZINESS: 0
CHEST TIGHTNESS: 0
NAUSEA: 0
APNEA: 0
COUGH: 0
CHOKING: 0
CHILLS: 0
BLOOD IN STOOL: 0
WHEEZING: 0
ABDOMINAL PAIN: 0
WEAKNESS: 1
PALPITATIONS: 0
SHORTNESS OF BREATH: 0
HEADACHES: 0

## 2022-11-15 ASSESSMENT — COGNITIVE AND FUNCTIONAL STATUS - GENERAL
WALKING IN HOSPITAL ROOM: A LITTLE
EATING MEALS: A LITTLE
DAILY ACTIVITIY SCORE: 22
PERSONAL GROOMING: A LITTLE
CLIMB 3 TO 5 STEPS WITH RAILING: A LOT
SUGGESTED CMS G CODE MODIFIER MOBILITY: CJ
SUGGESTED CMS G CODE MODIFIER DAILY ACTIVITY: CJ
MOBILITY SCORE: 21

## 2022-11-15 ASSESSMENT — PAIN DESCRIPTION - PAIN TYPE: TYPE: CHRONIC PAIN

## 2022-11-15 NOTE — CONSULTS
REFERRING PHYSICIAN: Radhika Roca MD    CONSULTING PHYSICIAN: Edu Meraz MD, FACS    CHIEF COMPLAINT: Chest pain    HISTORY OF PRESENT ILLNESS: The patient is a 46 y.o. male with a history of AVR (2015 [place], 2001 & 2011 [facility]), CAD, HTN, dyslipidemia, and Guillian [place] who was brought in to the ER by EMS with chest pain.  He was on his way to [place] from [place] with a stop in [place].  He was walking around with his walker when he developed sudden onset chest pain.  It was 8/10.  He had associated SOB.  No other associated signs or symptoms.  Exertion his only aggravating factor no alleviating factors.  EMS was called and he was transported to [facility].  He was admitted and US showed malfunctioning prosthetic valves.    PAST MEDICAL HISTORY:   Past Medical History:   Diagnosis Date    Elevated cholesterol     Guillain-Salisbury (HCC)     Hypertension     Hyperthyroidism        PAST SURGICAL HISTORY:   Past Surgical History:   Procedure Laterality Date    OTHER CARDIAC SURGERY      CABG x 3       FAMILY HISTORY:   No family history on file.     SOCIAL HISTORY:   Social History     Socioeconomic History    Marital status: Single     Spouse name: Not on file    Number of children: Not on file    Years of education: Not on file    Highest education level: Not on file   Occupational History    Not on file   Tobacco Use    Smoking status: Some Days     Packs/day: 0.50     Types: Cigarettes     Start date: 11/1/1985    Smokeless tobacco: Not on file   Substance and Sexual Activity    Alcohol use: Not on file    Drug use: Not on file    Sexual activity: Not on file   Other Topics Concern    Not on file   Social History Narrative    Not on file     Social Determinants of Health     Financial Resource Strain: Not on file   Food Insecurity: Not on file   Transportation Needs: Not on file   Physical Activity: Not on file   Stress: Not on file   Social Connections: Not on file   Intimate Partner Violence: Not on  file   Housing Stability: Not on file       ALLERGIES:   Allergies   Allergen Reactions    Penicillins Unspecified     .        CURRENT MEDICATIONS:     Current Facility-Administered Medications:     MD Alert...Warfarin per Pharmacy, , Other, PHARMACY TO DOSE, JAMES FeldmanP.R.N.    enoxaparin (Lovenox) inj 60 mg, 60 mg, Subcutaneous, Q12HRS, JAMES FeldmanP.R.N., 60 mg at 11/16/22 0532    warfarin (COUMADIN) tablet 5 mg, 5 mg, Oral, DAILY AT 1800, SUNDAY Feldman.P.R.N., 5 mg at 11/15/22 1721    metoprolol SR (TOPROL XL) tablet 25 mg, 25 mg, Oral, Q DAY, JAMES FeldmanP.R.N., 25 mg at 11/15/22 2037    spironolactone (ALDACTONE) tablet 12.5 mg, 12.5 mg, Oral, Q DAY, Brian Maloney M.D., 12.5 mg at 11/16/22 0530    atorvastatin (LIPITOR) tablet 80 mg, 80 mg, Oral, Q EVENING, Brian Maloney M.D., 80 mg at 11/15/22 1722    thiamine (Vitamin B-1) tablet 100 mg, 100 mg, Oral, TID, Brian Maloney M.D., 100 mg at 11/16/22 1052    gabapentin (NEURONTIN) capsule 100 mg, 100 mg, Oral, TID, Brain Maloney M.D., 100 mg at 11/16/22 0529    nitroglycerin (NITROSTAT) tablet 0.4 mg, 0.4 mg, Sublingual, Q5 MIN PRN, Brian Maloney M.D., 0.4 mg at 11/13/22 0855    acetaminophen (Tylenol) tablet 325 mg, 325 mg, Oral, Q6HRS PRN, Brian Maloney M.D., 325 mg at 11/16/22 0532    furosemide (LASIX) tablet 20 mg, 20 mg, Oral, BID DIURETIC, Brian Maloney M.D., 20 mg at 11/16/22 0529    lidocaine (LIDODERM) 5 % 1 Patch, 1 Patch, Transdermal, Q24HR, Brian Maloney M.D., 1 Patch at 11/15/22 1723    lisinopril (PRINIVIL) tablet 5 mg, 5 mg, Oral, Q DAY, Brian Maloney M.D., 5 mg at 11/16/22 0530    traMADol (Ultram) 50 MG tablet 50 mg, 50 mg, Oral, Q6HRS PRN, Angel Mujica M.D., 50 mg at 11/16/22 1054    melatonin tablet 5-10 mg, 5-10 mg, Oral, Nightly, Angel Mujica M.D., 5 mg at 11/15/22 2040    senna-docusate (PERICOLACE or SENOKOT S) 8.6-50 MG per tablet 2 Tablet,  2 Tablet, Oral, BID, 2 Tablet at 11/16/22 0529 **AND** polyethylene glycol/lytes (MIRALAX) PACKET 1 Packet, 1 Packet, Oral, QDAY PRN **AND** magnesium hydroxide (MILK OF MAGNESIA) suspension 30 mL, 30 mL, Oral, QDAY PRN **AND** bisacodyl (DULCOLAX) suppository 10 mg, 10 mg, Rectal, QDAY PRN, Ángel Dixon M.D.    clopidogrel (PLAVIX) tablet 75 mg, 75 mg, Oral, DAILY, Ángel Dixon M.D., 75 mg at 11/16/22 0530    ondansetron (ZOFRAN) syringe/vial injection 4 mg, 4 mg, Intravenous, Q4HRS PRN, Ángel Dixon M.D.    ondansetron (ZOFRAN ODT) dispertab 4 mg, 4 mg, Oral, Q4HRS PRN, Ángel Dixon M.D.    promethazine (PHENERGAN) tablet 12.5-25 mg, 12.5-25 mg, Oral, Q4HRS PRN, Ángel Dixon M.D.    promethazine (PHENERGAN) suppository 12.5-25 mg, 12.5-25 mg, Rectal, Q4HRS PRN, Ángel Dixon M.D.    prochlorperazine (COMPAZINE) injection 5-10 mg, 5-10 mg, Intravenous, Q4HRS PRN, Ángel Dixon M.D.    levothyroxine (SYNTHROID) tablet 125 mcg, 125 mcg, Oral, AM ES, Ángel Dixon M.D., 125 mcg at 11/16/22 0530     LABS REVIEWED:  Lab Results   Component Value Date/Time    SODIUM 136 11/16/2022 03:42 AM    POTASSIUM 4.5 11/16/2022 03:42 AM    CHLORIDE 99 11/16/2022 03:42 AM    CO2 26 11/16/2022 03:42 AM    GLUCOSE 111 (H) 11/16/2022 03:42 AM    BUN 16 11/16/2022 03:42 AM    CREATININE 0.70 11/16/2022 03:42 AM      Lab Results   Component Value Date/Time    PROTHROMBTM 14.1 11/16/2022 11:28 AM    INR 1.11 11/16/2022 11:28 AM      Lab Results   Component Value Date/Time    WBC 3.2 (L) 11/16/2022 03:42 AM    RBC 3.35 (L) 11/16/2022 03:42 AM    HEMOGLOBIN 12.0 (L) 11/16/2022 03:42 AM    HEMATOCRIT 35.7 (L) 11/16/2022 03:42 AM    .6 (H) 11/16/2022 03:42 AM    MCH 35.8 (H) 11/16/2022 03:42 AM    MCHC 33.6 (L) 11/16/2022 03:42 AM    MPV 12.4 11/16/2022 03:42 AM    NEUTSPOLYS 71.60 11/16/2022 03:42 AM    LYMPHOCYTES 17.10 (L) 11/16/2022 03:42 AM    MONOCYTES 10.40 11/16/2022 03:42 AM     EOSINOPHILS 0.30 11/16/2022 03:42 AM    BASOPHILS 0.30 11/16/2022 03:42 AM        IMAGING REVIEWED AND INTERPRETED:    ECHOCARDIOGRAM:   Known mitral valve bioprosthesis with apparent small perforation.   Nonspecific small echogenic material visualized on valve which is   independently mobile; unable to rule out endocarditis. Mild mitral   regurgitation.     Known bioprosthetic aortic valve with moderate, eccentric, possibly   paravalvular aortic regurgitation.    Possible thrombus in the left atrial appendage.    Known mitral valve bioprosthesis with apparent small perforation.   Nonspecific small echogenic material visualized on valve which is   independently mobile; unable to rule out endocarditis. Mild mitral   regurgitation.    Known bioprosthetic aortic valve with moderate, eccentric, possibly   paravalvular aortic regurgitation. . DI 0.32. AT <100ms. Normal   bioprosthetic valve mean gradient 11 mmHg.    Aorta  Not well visualized.       [place] [facility] [place]. CORONARY ANGIOGRAPHY:  Left main coronary artery: Moderate caliber bifurcating no CAD  Left anterior descending artery: Large caliber wrapping around the apex and supplying the apical two thirds of the inferior wall.  Left circumflex coronary artery: Large caliber and dominant chronic totally occluded at its most distal portion which receives well-developed collaterals from the right system.  No grafts identified.  Right coronary artery: High anterior takeoff small to moderate caliber vessel supplying a collateral to the distal circumflex.     III.  AORTOGRAM:  Normal caliber thoracic aorta      REVIEW OF SYSTEMS:   Review of Systems   Constitutional:  Positive for malaise/fatigue.   HENT: Negative.     Eyes: Negative.    Respiratory:  Positive for shortness of breath.    Cardiovascular:  Positive for chest pain.   Gastrointestinal: Negative.    Genitourinary: Negative.    Musculoskeletal:  Positive for myalgias.   Neurological:  Positive  "for weakness.   Endo/Heme/Allergies: Negative.    Psychiatric/Behavioral: Negative.         PHYSICAL EXAMINATION:  BP (!) 99/49   Pulse 62   Temp 36.5 °C (97.7 °F) (Temporal)   Resp 18   Ht 1.702 m (5' 7\")   Wt 60.7 kg (133 lb 13.1 oz)   SpO2 98%   BMI 20.96 kg/m²      Physical Exam  Vitals and nursing note reviewed.   HENT:      Head: Normocephalic and atraumatic.   Eyes:      Extraocular Movements: Extraocular movements intact.      Pupils: Pupils are equal, round, and reactive to light.   Cardiovascular:      Rate and Rhythm: Normal rate and regular rhythm.      Heart sounds: Murmur heard.   Pulmonary:      Effort: Pulmonary effort is normal. No respiratory distress.   Abdominal:      General: Abdomen is flat. Bowel sounds are normal. There is no distension.   Musculoskeletal:      Right lower leg: Edema present.      Left lower leg: Edema present.   Skin:     General: Skin is warm and dry.   Neurological:      Mental Status: He is alert. Mental status is at baseline.       IMPRESSION:  Moderate paravalvular aortic regurgitation, mild mitral stenosis and regurgitation, status post aortic valve replacement and likely mitral valve repair x3), chronic left ventricular systolic and diastolic failure, severe dilated nonischemic cardiomyopathy, history of Guillain-Barré    PLAN:  I do not recommend fourth time redo aortic and mitral valve replacements.  The patient's left ventricular ejection fraction is 10 to 15% and improved very little with dobutamine injection.  He would not survive this type of operation.  The procedure, its risks, benefits, potential complications and alternative treatments were discussed with the patient in detail.  The STS mortality risk score is 10% and the morbidity and mortality risk score is 37%. The scores were discussed with patient.  I recommend continuing medical management and referral to a center with higher level of care.  The patient's preference is to be referred to [facility] [place].    Findings and recommendations have been discussed with the patient’s cardiologist, Robbin Arndt MD.  Thank you for this very challenging consultation and participation in the patient’s care.  I will keep you apprised of all future developments.    Sincerely,    Edu Meraz MD, FACS      I,  Edu Meraz MD, FACS, performed a substantial portion of the EM visit face-to-face with the same patient on the same date of service with, VAMSHI Wilson. I was personally involved in reviewing and interpreting the films and conducted elements of the history and physical exam. I performed all of the medical decision making for the patient.

## 2022-11-15 NOTE — PROGRESS NOTES
"Cardiology Follow Up Progress Note    Date of Service  11/15/2022    Attending Physician  Harjit Noonan M.D.    Chief Complaint   Chest Pain    HPI  Kristopher Leroy is a 46 y.o. male admitted 11/12/2022 with per Dr. Roca, \"with history of valve replacement at [facility] (2015, previously replaced two times in [facility] in 2001 and 2011), CAD (patient reports prior MI and possible CABG), HTN, dyslipidemia, and Guillian [place] who presented with chest pain. Cardiology is consulted for NSTEMI.     The patient endorses prior distant MI and thinks he had bypass surgery but no stents.  The patient reports having valve replaced a total of 3 times, but does not know which valve.  Unfortunately, the records are being requested, and not available at this time.      The patient is travelling to see his sister, he is from [facility].  He started having chest pain yesterday when he was walking to catch a train.  He reports associated shortness of breath.  He denies any orthopnea, PND, or leg swelling. No palpitations.  No syncope or presyncopal episodes.\"     Interim Events  11/14/2022: No overnight cardiac events. Patient continues to report chest pain. Tele monitoring personally interpreted by me shows SR. VSS; RA. Labs reviewed. NPO this AM for CARLOS. [facility] [place]. High risk surgical candidate; consider referral to OP quaternary center    Review of Systems  Review of Systems   Respiratory:  Negative for apnea, cough, choking, chest tightness, shortness of breath, wheezing and stridor.    Cardiovascular:  Negative for chest pain, palpitations and leg swelling.     Vital signs in last 24 hours  Temp:  [36.2 °C (97.2 °F)-37 °C (98.6 °F)] 36.8 °C (98.2 °F)  Pulse:  [63-91] 88  Resp:  [8-19] 16  BP: ()/(43-67) 99/52  SpO2:  [89 %-99 %] 93 %    Physical Exam  Physical Exam  Vitals and nursing note reviewed.   Constitutional:       General: He is not in acute distress.  Cardiovascular:    "   Rate and Rhythm: Normal rate and regular rhythm.      Pulses: Normal pulses.      Heart sounds: Murmur heard.   Pulmonary:      Effort: Pulmonary effort is normal. No respiratory distress.      Breath sounds: Normal breath sounds.   Abdominal:      General: Bowel sounds are normal. There is no distension.      Palpations: Abdomen is soft.   Musculoskeletal:         General: No swelling.      Cervical back: Normal range of motion.      Right lower leg: No edema.      Left lower leg: No edema.   Skin:     General: Skin is warm and dry.   Neurological:      General: No focal deficit present.      Mental Status: He is alert and oriented to person, place, and time. Mental status is at baseline.   Psychiatric:         Mood and Affect: Mood normal.         Behavior: Behavior normal.       Lab Review  Lab Results   Component Value Date/Time    WBC 3.9 (L) 11/15/2022 02:38 AM    RBC 3.26 (L) 11/15/2022 02:38 AM    HEMOGLOBIN 11.5 (L) 11/15/2022 02:38 AM    HEMATOCRIT 34.2 (L) 11/15/2022 02:38 AM    .9 (H) 11/15/2022 02:38 AM    MCH 35.3 (H) 11/15/2022 02:38 AM    MCHC 33.6 (L) 11/15/2022 02:38 AM    MPV 10.5 11/15/2022 02:38 AM      Lab Results   Component Value Date/Time    SODIUM 139 11/15/2022 02:38 AM    POTASSIUM 4.0 11/15/2022 02:38 AM    CHLORIDE 104 11/15/2022 02:38 AM    CO2 26 11/15/2022 02:38 AM    GLUCOSE 108 (H) 11/15/2022 02:38 AM    BUN 19 11/15/2022 02:38 AM    CREATININE 0.67 11/15/2022 02:38 AM      Lab Results   Component Value Date/Time    ASTSGOT 43 11/15/2022 02:38 AM    ALTSGPT 59 (H) 11/15/2022 02:38 AM     Lab Results   Component Value Date/Time    CHOLSTRLTOT 114 11/13/2022 04:00 AM    LDL 51 11/13/2022 04:00 AM    HDL 57 11/13/2022 04:00 AM    TRIGLYCERIDE 31 11/13/2022 04:00 AM    TROPONINT 57 (H) 11/13/2022 09:45 AM       No results for input(s): NTPROBNP in the last 72 hours.    Cardiac Imaging and Procedures Review  EKG:  My personal interpretation of the EKG dated 11/13/2022 is SR  with first degree AV block.     Echocardiogram (11/13/2022):  No prior study is available for comparison.   The left ventricle is moderately dilated.  Severely reduced left ventricular systolic function.  The left ventricular ejection fraction is visually estimated to be 20%   to 30%.  Grade III diastolic dysfunction (restrictive pattern).  Moderately dilated right ventricle.  Reduced right ventricular systolic function.  Mild mitral stenosis.  Dysfunctional bioprosthetic aortic valve with moderate aortic valve   regurgitation. Pressure half time is 332 msec.  Moderate tricuspid regurgitation.  Right ventricular systolic pressure is estimated to be 40 mmHg.  Normal inferior vena cava size and inspiratory collapse.    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  HEMODYNAMICS              Ao: 86/61 mmHg              LVEDP: 16 mmHg              Gradient on LV pullback: Yes,      II. CORONARY ANGIOGRAPHY:  Left main coronary artery: Moderate caliber bifurcating no CAD  Left anterior descending artery: Large caliber wrapping around the apex and supplying the apical two thirds of the inferior wall.  Left circumflex coronary artery: Large caliber and dominant chronic totally occluded at its most distal portion which receives well-developed collaterals from the right system.  No grafts identified.  Right coronary artery: High anterior takeoff small to moderate caliber  vessel supplying a collateral to the distal circumflex.     III.  AORTOGRAM:  Normal caliber thoracic aorta    CARLOS (11/14/2022): Known mitral valve bioprosthesis with apparent small perforation.   Nonspecific small echogenic material visualized on valve which is   independently mobile; unable to rule out endocarditis. Mild mitral   regurgitation.     Known bioprosthetic aortic valve with moderate, eccentric, possibly   paravalvular aortic regurgitation.     Possible thrombus in the left atrial appendage.    Imaging  Chest X-Ray (11/12/2022): Mildly enlarged cardiac silhouette with vascular congestion.     Assessment/Plan  CAD  -LCX  with collaterals  -Continue home asa and plavix  -Continue high intensity statin  -Transition to metop XL  -[facility]     Prior valve replacement; Valvular Cardiomyopathy; Possible MELVIN thrombus; Compensated HFrEF   -Bioprosthetic valve dysfunction on CARLOS  -declined by CT surgery. Outpatient quaternary referral  -PO lasix 20 mg BID  -Declined by [facility] in the past  -bridge to coumadin  -Agree with palliative consult     Hypertension  -Continue metoprolol as above  -Continue lisinopril 5 mg daily    Thank you for allowing me to participate in the care of this patient.  Cardiology will sign off on this patient    Future Appointments   Date Time Provider Department Center   12/7/2022  2:30 PM MARILY Arita. CB None     Please contact me with any questions.    Please see Dr. Arndt's attestation for further details and MDM.     I personally spent a total of 15 minutes which includes face-to-face time and non-face-to-face time spent on preparing to see the patient, reviewing hospital notes and tests, obtaining history from the patient, performing a medically appropriate exam, counseling and educating the patient, ordering medications/tests/procedures/referrals as clinically indicated, and documenting information in the  electronic medical record.    MARILY Feldman.   [facility]  [phone]

## 2022-11-15 NOTE — PROGRESS NOTES
[facility] Internal Medicine Daily Progress Note    Date of Service  11/14/2022    R Team: R IM Green Team   Attending: Harjit Noonan M.d.  Senior Resident: Dr. Harrington  Intern:  Dr. Maloney  Contact Number: [phone]    Chief Complaint  Kristopher Leroy is a 46 y.o. male admitted 11/12/2022 with chest pain    ID  Mr. Kristopher Leroy is 45 yo male hx of Guillain-[place], hypothyroidism, valvular dz/CAD, hx of open heart surgery  with bioprosthetic aortic valve on DAPT who presented on 11/12/2022 with chest pain, cath results no evidence of ACS and further evaluation with CARLOS with concerns for bioprosthetic MV/AV dysfunction, left atrial appendage thrombus now on heparin gtt, and echogenic material on MV concern for endocarditis.    Hospital Course  Mr. Kristopher Leroy is 45 yo male hx of Guillain-[place], hypothyroidism, valvular dz/CAD, hx of open heart surgery with bioprosthetic aortic valve on DAPT who presented on 11/12/2022 with chest pain. Patient live in [place] and was traveling in [place] via train and stopped here due to chest pain, pressure that increased with movement and SOB, non-radiating. Takes aspirin, plavix, lasix for hrt and valve dz. EKG with T inversion unsure if new, troponin elevated 70s now down trended to steady in 50s. Repeat EKG throughout the day 11/13, no changes seen with concern for STEMI although chest pain consistently 9-10/10. Cardiology consulted, Dr. Roca evaluated patient for cath lab today. Cath results no concerns for ACS, recs for reevaluation of bioprosthetic aortic valve dysfunction. TTE with reduced EF.  CARLOS with concerns for bioprosthetic MV/AV dysfunction, left atrial appendage thrombus now on heparin gtt, and echogenic material on MV concern for endocarditis getting BCX. Cardiology consulted CTS regarding valves.     Interval Problem Update  Overnight, pt had 10/10 chest pain and was given tramadol. Inflammatory/ACS etiology ruled out.  This am patient with 10/10 chest  pain, able to use incentive spirometer well with no exacerbation of chest pain.   -CARLOS completed with concerns for valvular dysfunction with CTS consulted by Cardiology, LA appendage thrombus on heparin gtt, echogenic material MV concern for endocarditis with BCX pending  -Gabapentin added for pain  -Palliative care consult placed  -outside records request sent -pending    I have discussed this patient's plan of care and discharge plan at IDT rounds today with Case Management, Nursing, Nursing leadership, and other members of the IDT team.    Consultants/Specialty  cardiology    Code Status  Full Code    Disposition  Patient is not medically cleared for discharge.   Anticipate discharge to to home with close outpatient follow-up.  I have placed the appropriate orders for post-discharge needs.    Review of Systems  Review of Systems   Constitutional:  Negative for chills, fever and malaise/fatigue.   Respiratory:  Negative for cough and shortness of breath.    Cardiovascular:  Positive for chest pain. Negative for palpitations and leg swelling.   Gastrointestinal:  Negative for abdominal pain, constipation, diarrhea and nausea.   Genitourinary:  Negative for dysuria and hematuria.   Musculoskeletal:  Negative for myalgias.   Neurological:  Negative for dizziness and headaches.      Physical Exam  Temp:  [36.2 °C (97.2 °F)-36.9 °C (98.4 °F)] 36.6 °C (97.8 °F)  Pulse:  [63-81] 77  Resp:  [8-19] 16  BP: ()/(43-65) 87/55  SpO2:  [95 %-99 %] 95 %    Physical Exam  Constitutional:       General: He is not in acute distress.     Appearance: Normal appearance. He is ill-appearing.      Comments: Comfortable in bed, able to use incentive spirometer well without exacerbation of chest pain   HENT:      Head: Normocephalic and atraumatic.      Right Ear: External ear normal.      Left Ear: External ear normal.      Nose: Nose normal.      Mouth/Throat:      Mouth: Mucous membranes are moist.   Eyes:      General: No  scleral icterus.        Right eye: No discharge.         Left eye: No discharge.      Extraocular Movements: Extraocular movements intact.      Conjunctiva/sclera: Conjunctivae normal.      Pupils: Pupils are equal, round, and reactive to light.   Cardiovascular:      Rate and Rhythm: Normal rate and regular rhythm.      Pulses: Normal pulses.      Heart sounds: Murmur heard.      Comments: Sternotomy scar, left upper chest scar from past pacemaker  Systolic murmur 3/6 aortic region  Pulmonary:      Effort: Pulmonary effort is normal. No respiratory distress.      Breath sounds: Normal breath sounds. No wheezing or rales.   Chest:      Chest wall: Tenderness present.   Abdominal:      General: Abdomen is flat. Bowel sounds are normal. There is no distension.      Palpations: Abdomen is soft.   Musculoskeletal:         General: Deformity present. No swelling or tenderness. Normal range of motion.      Cervical back: Normal range of motion.      Right lower leg: No edema.      Left lower leg: No edema.      Comments: Bilateral hands fingers are contracted   Skin:     General: Skin is warm and dry.      Coloration: Skin is not jaundiced.   Neurological:      General: No focal deficit present.      Mental Status: He is alert and oriented to person, place, and time.   Psychiatric:         Mood and Affect: Mood normal.         Behavior: Behavior normal.         Thought Content: Thought content normal.         Judgment: Judgment normal.       Fluids    Intake/Output Summary (Last 24 hours) at 11/14/2022 1900  Last data filed at 11/14/2022 1036  Gross per 24 hour   Intake 540 ml   Output 725 ml   Net -185 ml       Laboratory  Recent Labs     11/13/22  0400 11/13/22  0945 11/14/22  0212   WBC 4.1* 4.7* 5.0   RBC 3.66* 3.52* 3.53*   HEMOGLOBIN 12.9* 12.4* 12.5*   HEMATOCRIT 38.2* 36.7* 37.1*   .4* 104.3* 105.1*   MCH 35.2* 35.2* 35.4*   MCHC 33.8 33.8 33.7   RDW 51.4* 51.6* 52.8*   PLATELETCT 115* 111* 111*   MPV 10.9  10.3 10.5     Recent Labs     11/13/22  0400 11/13/22  0945 11/14/22  0212   SODIUM 137 138 137   POTASSIUM 3.8 3.9 4.5   CHLORIDE 100 102 103   CO2 25 26 22   GLUCOSE 134* 104* 112*   BUN 21 20 23*   CREATININE 0.75 0.57 0.80   CALCIUM 8.6 8.6 8.8     Recent Labs     11/12/22  1627 11/13/22  0945 11/14/22  1254   APTT 30.2 42.5* 30.5   INR 1.01 1.10 1.04         Recent Labs     11/13/22  0400   TRIGLYCERIDE 31   HDL 57   LDL 51       Imaging  EC-CARLOS W/O CONT   Final Result      CT-CTA CHEST PULMONARY ARTERY W/ RECONS   Final Result         1. No CT evidence of pulmonary embolism.      2. Mild hazy groundglass opacities throughout both lungs, right slightly more than left, likely mild pulmonary edema.      3. Patchy bibasilar atelectasis.         US-RUQ   Final Result      1. Status post cholecystectomy. The common bile duct is obscured by overlying bowel gas.   2. Homogeneous appearance of the liver with no hepatic mass.      DX-CHEST-PORTABLE (1 VIEW)   Final Result      1.  Mildly enlarged cardiac silhouette with vascular congestion.      EC-ECHOCARDIOGRAM COMPLETE W/O CONT    (Results Pending)   CL-LEFT HEART CATHETERIZATION WITH POSSIBLE INTERVENTION    (Results Pending)        Assessment/Plan  Problem Representation:  Mr. Kristopher Leroy is 45 yo male hx of Guillain-[place], hypothyroidism, valvular dz/CAD, hx of open heart surgery  with bioprosthetic aortic valve on DAPT who presented on 11/12/2022 with chest pain, cath results no evidence of ACS and further evaluation with CARLOS with concerns for bioprosthetic MV/AV dysfunction, left atrial appendage thrombus now on heparin gtt, and echogenic material on MV concern for endocarditis.    * NSTEMI (non-ST elevated myocardial infarction) (HCC)- (present on admission)  Assessment & Plan  Ruled out by Cath results no ACS      Chest pain  Assessment & Plan  Chest pain and elevated troponin that has now been steady in 50s  History of aortic bioprosthetic valve  replacement on DAPT, per patient replaced three times   Hx of pacemaker placement with infection requiring removal, has a loop recorder implanted  EKG showed T inversion, no prior records to compare if new. Obtaining records from [facility] and [facility]. CARLOS EF 20% with concerns for bioprosthetic MV/AV dysfunction, left atrial appendage thrombus now on heparin gtt, and echogenic material on MV concern for endocarditis.  -Cardiology following, apprec recs, CTS was consulted for valvular dysfunction  -continue with home DAPT aspirin 81mg and plavix 75mg  -metoprolol 12.5 mg BID  -lisinopril 5mg  -bcx pending  -heparin GTT      Elevated liver enzymes  Assessment & Plan  Elevated ALT more than AST, abdominal exam no tenderness.   Does have history of alcohol binging, last drink binge >5 drinks 2 weeks ago, last drink of 1 beer 2 days ago  US RUQ - resected gallbladder, liver homogenous no mass  -Continue to monitor LFTs while on statin  -Tylenol prn dose limit <2g    Hypothyroidism  Assessment & Plan  TSH elevated, T4 wnl  -Continue levothyroxine 125 mcg daily      Alcohol consumption binge drinking  Assessment & Plan  Binged drinking >5 drinks 2 weeks ago, last drink 1 beer 2 days ago  -low threshold CIWA  -seizure precaution    Macrocytic anemia  Assessment & Plan  H/H and MCV evident of macrocytic anemia  -B12, folate, Fe panel, ferriten pending    Dyslipidemia  Assessment & Plan  Continue atorvastatin 80 mg daily    Guillain-[place] (HCC)  Assessment & Plan  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 In review of yesterday's note (11/13/2022), there are no changes except as documented above.

## 2022-11-15 NOTE — PROGRESS NOTES
[facility] Internal Medicine Daily Progress Note    Date of Service  11/15/2022    [employer] Team: R IM Green Team   Attending: Harjit Noonan M.d.  Senior Resident: Dr. Harrington  Intern:  Dr. Maloney  Contact Number: [phone]    Chief Complaint  Kristopher Leroy is a 46 y.o. male admitted 11/12/2022 with chest pain    ID  Mr. Kristopher Leroy is 45 yo male hx of Guillain-[place], hypothyroidism, valvular dz/CAD, hx of open heart surgery  with bioprosthetic aortic valve on DAPT who presented on 11/12/2022 with chest pain, cath results no evidence of ACS and further evaluation with CARLOS with concerns for bioprosthetic MV/AV dysfunction, left atrial appendage thrombus, and echogenic material on MV concern for endocarditis. Recommendation from cardiothoracic surgery and cardiology for f/u with [facility] for further management for valves, continue with plavix w/o aspirin and bridge heparin to warfarin. Endocarditis workup with procal negative and BCX NGTD.    Hospital Course  Mr. Kristopher Leroy is 45 yo male hx of Guillain-[place], hypothyroidism, valvular dz/CAD, hx of open heart surgery with bioprosthetic aortic valve on DAPT who presented on 11/12/2022 with chest pain. Patient live in [place] and was traveling in [place] via train and stopped here due to chest pain, pressure that increased with movement and SOB, non-radiating. Takes aspirin, plavix, lasix for hrt and valve dz. EKG with T inversion unsure if new, troponin elevated 70s now down trended to steady in 50s. Repeat EKG throughout the day 11/13, no changes seen with concern for STEMI although chest pain consistently 9-10/10. Cardiology consulted, Dr. Roca evaluated patient for cath lab today. Cath results no concerns for ACS, recs for reevaluation of bioprosthetic aortic valve dysfunction. TTE with reduced EF.  CARLOS with concerns for bioprosthetic MV/AV dysfunction, left atrial appendage thrombus now on heparin gtt, and echogenic material on MV concern for endocarditis.  Recommendation from cardiothoracic surgery and cardiology for f/u with [facility] for further management for valves, continue with plavix w/o aspirin and bridge heparin to warfarin. Endocarditis workup with procal negative and BCX NGTD.    Interval Problem Update  Overnight nursing note with concern for marijuana smell in patient's room. Marijuana present, patient denied smoking.  This am patient states has on/off 10/10 chest pain described as squeezing sensation. Denies palpitations, shortness of breath, swelling, fever, chills.  Cardiology and Cardiothoracic surgery recommendations for f/u with [facility] for valves, switch to coumadin and metoprolol SR. Continue with plavix  w/o aspirin.   Procal and BCX negative.    I have discussed this patient's plan of care and discharge plan at IDT rounds today with Case Management, Nursing, Nursing leadership, and other members of the IDT team.    Consultants/Specialty  cardiology    Code Status  Full Code    Disposition  Patient is not medically cleared for discharge.   Anticipate discharge to to home with close outpatient follow-up.  I have placed the appropriate orders for post-discharge needs.    Review of Systems  Review of Systems   Constitutional:  Negative for chills, fever and malaise/fatigue.   Respiratory:  Negative for shortness of breath.    Cardiovascular:  Positive for chest pain. Negative for palpitations and leg swelling.   Gastrointestinal:  Negative for abdominal pain, blood in stool and nausea.   Genitourinary:  Negative for hematuria.   Musculoskeletal:  Negative for myalgias.   Neurological:  Negative for dizziness and headaches.      Physical Exam  Temp:  [36.8 °C (98.2 °F)-37 °C (98.6 °F)] 36.9 °C (98.4 °F)  Pulse:  [61-91] 77  Resp:  [16] 16  BP: ()/(51-67) 114/56  SpO2:  [89 %-98 %] 97 %    Physical Exam  Constitutional:       General: He is not in acute distress.     Appearance: Normal appearance. He is ill-appearing.      Comments: Comfortable in bed,  able to use incentive spirometer well without exacerbation of chest pain   HENT:      Head: Normocephalic and atraumatic.      Right Ear: External ear normal.      Left Ear: External ear normal.      Nose: Nose normal.      Mouth/Throat:      Mouth: Mucous membranes are moist.   Eyes:      General: No scleral icterus.        Right eye: No discharge.         Left eye: No discharge.      Extraocular Movements: Extraocular movements intact.      Conjunctiva/sclera: Conjunctivae normal.      Comments: Pupils equal   Cardiovascular:      Rate and Rhythm: Normal rate and regular rhythm.      Pulses: Normal pulses.      Heart sounds: Murmur heard.      Comments: Sternotomy scar, left upper chest scar from past pacemaker  Systolic murmur 3/6 aortic region  Pulmonary:      Effort: Pulmonary effort is normal. No respiratory distress.      Breath sounds: Normal breath sounds. No wheezing or rales.   Abdominal:      General: Abdomen is flat. Bowel sounds are normal. There is no distension.      Palpations: Abdomen is soft.   Musculoskeletal:         General: Deformity present. No swelling or tenderness. Normal range of motion.      Cervical back: Normal range of motion.      Right lower leg: No edema.      Left lower leg: No edema.      Comments: Bilateral hands fingers are contracted  Left toes removed   Skin:     General: Skin is warm and dry.      Coloration: Skin is not jaundiced.   Neurological:      General: No focal deficit present.      Mental Status: He is alert and oriented to person, place, and time.   Psychiatric:         Mood and Affect: Mood normal.         Behavior: Behavior normal.         Thought Content: Thought content normal.         Judgment: Judgment normal.       Fluids    Intake/Output Summary (Last 24 hours) at 11/15/2022 1530  Last data filed at 11/15/2022 1411  Gross per 24 hour   Intake 360 ml   Output 1125 ml   Net -765 ml       Laboratory  Recent Labs     11/13/22  0945 11/14/22  0212  11/15/22  0238   WBC 4.7* 5.0 3.9*   RBC 3.52* 3.53* 3.26*   HEMOGLOBIN 12.4* 12.5* 11.5*   HEMATOCRIT 36.7* 37.1* 34.2*   .3* 105.1* 104.9*   MCH 35.2* 35.4* 35.3*   MCHC 33.8 33.7 33.6*   RDW 51.6* 52.8* 51.9*   PLATELETCT 111* 111* 88*   MPV 10.3 10.5 10.5     Recent Labs     11/13/22  0945 11/14/22  0212 11/15/22  0238   SODIUM 138 137 139   POTASSIUM 3.9 4.5 4.0   CHLORIDE 102 103 104   CO2 26 22 26   GLUCOSE 104* 112* 108*   BUN 20 23* 19   CREATININE 0.57 0.80 0.67   CALCIUM 8.6 8.8 8.6     Recent Labs     11/12/22  1627 11/13/22  0945 11/14/22  1254   APTT 30.2 42.5* 30.5   INR 1.01 1.10 1.04         Recent Labs     11/13/22  0400   TRIGLYCERIDE 31   HDL 57   LDL 51       Imaging  EC-CARLOS W/O CONT   Final Result      EC-ECHOCARDIOGRAM COMPLETE W/O CONT   Final Result      CT-CTA CHEST PULMONARY ARTERY W/ RECONS   Final Result         1. No CT evidence of pulmonary embolism.      2. Mild hazy groundglass opacities throughout both lungs, right slightly more than left, likely mild pulmonary edema.      3. Patchy bibasilar atelectasis.         US-RUQ   Final Result      1. Status post cholecystectomy. The common bile duct is obscured by overlying bowel gas.   2. Homogeneous appearance of the liver with no hepatic mass.      DX-CHEST-PORTABLE (1 VIEW)   Final Result      1.  Mildly enlarged cardiac silhouette with vascular congestion.      CL-LEFT HEART CATHETERIZATION WITH POSSIBLE INTERVENTION    (Results Pending)        Assessment/Plan  Problem Representation:  Mr. Kristopher Leroy is 47 yo male hx of Guillain-[place], hypothyroidism, valvular dz/CAD, hx of open heart surgery  with bioprosthetic aortic valve on DAPT who presented on 11/12/2022 with chest pain, cath results no evidence of ACS and further evaluation with CARLOS with concerns for bioprosthetic MV/AV dysfunction, left atrial appendage thrombus, and echogenic material on MV concern for endocarditis. Recommendation from cardiothoracic surgery and  cardiology for f/u with [facility] for further management for valves, continue with plavix w/o aspirin and bridge heparin to warfarin. Endocarditis workup with procal negative and BCX NGTD.    * NSTEMI (non-ST elevated myocardial infarction) (HCC)- (present on admission)  Assessment & Plan  Ruled out by Cath results no ACS      Chest pain  Assessment & Plan  Chest pain and elevated troponin that has now been steady in 50s  History of aortic bioprosthetic valve replacement on DAPT, per patient replaced three times   Hx of pacemaker placement with infection requiring removal, has a loop recorder implanted  EKG showed T inversion, no prior records to compare if new. Obtaining records from [facility] and [facility]. CARLOS EF 20% with concerns for bioprosthetic MV/AV dysfunction, left atrial appendage thrombus now on heparin gtt, and echogenic material on MV concern for endocarditis.  -Cardiology following, apprec recs, CTS was consulted for valvular dysfunction: Recommendation for f/u with [facility] for further management for valves, continue with plavix w/o aspirin and bridge heparin to warfarin. Endocarditis workup with procal negative and BCX NGTD.  -continue plavix 75mg  -metoprolol SR 25mg  -lisinopril 5mg  -atorvastatin 80mg  -lasix 20mg BID  -Lovenox bridge to Coumadin      Elevated liver enzymes  Assessment & Plan  Elevated ALT more than AST, abdominal exam no tenderness.   Does have history of alcohol binging, last drink binge >5 drinks 2 weeks ago, last drink of 1 beer 2 days ago  US RUQ - resected gallbladder, liver homogenous no mass  -Continue to monitor LFTs while on statin  -Tylenol prn dose limit <2g    Hypothyroidism  Assessment & Plan  TSH elevated, T4 wnl  -Continue levothyroxine 125 mcg daily      Alcohol consumption binge drinking  Assessment & Plan  Binged drinking >5 drinks 2 weeks ago, last drink 1 beer 2 days ago  -low threshold CIWA  -seizure precaution    Macrocytic anemia  Assessment &  Plan  H/H and MCV evident of macrocytic anemia  -B12, folate, Fe panel, ferriten all wnl    Dyslipidemia  Assessment & Plan  Continue atorvastatin 80 mg daily    Guillain-[place] (HCC)  Assessment & Plan  Hx of GBS with respiratory failure requiring intubation and ICU stay  Deformity of bilateral fingers contractures         VTE prophylaxis: SCDs/TEDs, lovenox to coumadin    I have performed a physical exam and reviewed and updated ROS and Plan today (11/15/2022). In review of yesterday's note (11/14/2022), there are no changes except as documented above.

## 2022-11-15 NOTE — PROGRESS NOTES
Bedside report received and patient care assumed. Pt is sitting up in bed, A&O4, with ongoing intermittent complaints of chest pain, and is on RA. Tele box on. All fall precautions are in place, belongings at bedside table.  Pt was updated on POC, no questions or concerns. Pt educated on use of call light for assistance.

## 2022-11-15 NOTE — WOUND TEAM
[facility]  Inpatient Services  Initial Wound and Skin Care Evaluation    Admission Date: 11/12/2022     Last order of IP CONSULT TO WOUND CARE was found on 11/14/2022 from Hospital Encounter on 11/12/2022     HPI, PMH, SH: Reviewed    Past Surgical History:   Procedure Laterality Date    OTHER CARDIAC SURGERY      CABG x 3     Social History     Tobacco Use    Smoking status: Some Days     Packs/day: 0.50     Types: Cigarettes     Start date: 11/1/1985    Smokeless tobacco: Not on file   Substance Use Topics    Alcohol use: Not on file     Chief Complaint   Patient presents with    Chest Pain     X 2 hours. Came in by ems. EMS gave 324 asa pta     Diagnosis: NSTEMI (non-ST elevated myocardial infarction) (HCC) [I21.4]    Unit where seen by Wound Team: [id number]/02     WOUND CONSULT/FOLLOW UP RELATED TO:  Bilateral feet     WOUND HISTORY:  Pt is a 46yr old male admitted with chest pain. Per chart review pt has a history of Guillain-[place], CAD and possible open heart surgery. Pt lives in [place] and was traveling to [place] via train when he began having chest pain and therefore got off train and presented to [facility]. Wound team was consulted regarding bilateral feet.    WOUND ASSESSMENT/LDA  Wound 11/14/22 Partial Thickness Wound Foot Lateral;Plantar Left (Active)   Wound Image    11/15/22 1400   Site Assessment Red;Drainage    Periwound Assessment Callused    Margins Attached edges;Defined edges    Closure Adhesive bandage    Drainage Amount Scant    Drainage Description Serosanguineous    Treatments Cleansed;Site care;Offloading;Autolytic Debridement;CSWD - Conservative Sharp Wound Debridement    Wound Cleansing Approved Wound Cleanser    Periwound Protectant Skin Protectant Wipes to Periwound    Dressing Cleansing/Solutions Not Applicable    Dressing Options Hydrocolloid Thin;Mepilex    Dressing Changed New    Dressing Status Clean;Dry;Intact    Dressing Change/Treatment Frequency Every 72 hrs, and As  Needed    NEXT Dressing Change/Treatment Date 11/18/22    NEXT Weekly Photo (Inpatient Only) 11/22/22    Non-staged Wound Description Partial thickness    Shape Small callused circular    Wound Odor None    Exposed Structures None    WOUND NURSE ONLY - Time Spent with Patient (mins) 60    Number of days: 1      Vascular:    NABIL:   No results found.    Lab Values:    Lab Results   Component Value Date/Time    WBC 3.9 (L) 11/15/2022 02:38 AM    RBC 3.26 (L) 11/15/2022 02:38 AM    HEMOGLOBIN 11.5 (L) 11/15/2022 02:38 AM    HEMATOCRIT 34.2 (L) 11/15/2022 02:38 AM    CREACTPROT <0.30 11/13/2022 09:45 AM    SEDRATEWES 6 11/13/2022 09:45 AM    HBA1C 5.5 11/12/2022 04:27 PM      Culture Results show:  No results found for this or any previous visit (from the past 720 hour(s)).    Pain Level/Medicated:  Denies pain       INTERVENTIONS BY WOUND TEAM:  Chart and images reviewed. Discussed with bedside RN. All areas of concern (based on picture review, LDA review and discussion with bedside RN) have been thoroughly assessed. Documentation of areas based on significant findings. This RN in to assess patient. Performed standard wound care which includes appropriate positioning, dressing removal and non-selective debridement. Pictures and measurements obtained weekly if/when required.  Preparation for Dressing removal: NA Open to air  Non-selectively Debrided with:  NS and gauze.  Sharp debridement: Thick callusand non viable tissue debrided away using Curette. <20 cm2 debrided. scant bleeding noted, controlled with manual pressure.  Erma wound: Cleansed with NS and gauze, Prepped with no sting  Primary Dressing: hydrocolloid thin  Secondary (Outer) Dressing: half a sacral mepilex    Bilateral heels intact and were dressed with mepilex. Pt also has a small dry callused blood blister to the right 2nd toe which was left open to air.    Interdisciplinary consultation: Patient, Bedside RN,    EVALUATION / RATIONALE FOR  TREATMENT:  Most Recent Date:  11/15/22: Pt had callus with blood noted under. No wound noted. Hydrocolloid thin applied to soften tissue. Mepilex to offload pressure.      Goals: Steady decrease in wound area and depth weekly.    WOUND TEAM PLAN OF CARE ([X] for frequency of wound follow up,):   Nursing to follow dressing orders written for wound care. Contact wound team if area fails to progress, deteriorates or with any questions/concerns if something comes up before next scheduled follow up (See below as to whether wound is following and frequency of wound follow up)  Dressing changes by wound team:                   Follow up 3 times weekly:                NPWT change 3 times weekly:     Follow up 1-2 times weekly:      Follow up Bi-Monthly:           Follow up Monthly (High Risk):                        Follow up as needed:   X  Other (explain):     NURSING PLAN OF CARE ORDERS (X):  Dressing changes: See Dressing Care orders: X  Skin care: See Skin Care orders:   RN Prevention Protocol: X  Rectal tube care: See Rectal Tube Care orders:   Other orders:    RSKIN:   CURRENTLY IN PLACE (X), APPLIED THIS VISIT (A), ORDERED (O):   Q shift Yuri:  X  Q shift pressure point assessments:  X    Surface/Positioning   Pressure redistribution mattress        X    Low Airloss          ICU Low Airloss   Bariatric AMRIT     Waffle cushion        Waffle Overlay     O     Reposition q 2 hours    O  TAPs Turning system     Z Bowen Pillow     Offloading/Redistribution   Sacral Mepilex (Silicone dressing)     Heel Mepilex (Silicone dressing)      A   Heel float boots (Prevalon boot)             Float Heels off Bed with Pillows           Respiratory Room Air  Silicone O2 tubing         Gray Foam Ear protectors     Cannula fixation Device (Tender )          High flow offloading Clip    Elastic head band offloading device      Anchorfast                                                         Trach with Optifoam split foam              Containment/Moisture Prevention MIGUEL    Rectal tube or BMS    Purwick/Condom Cath        Dunne Catheter    Barrier wipes           Barrier paste       Antifungal tx      Interdry        Mobilization MIGUEL      Up to chair        Ambulate      PT/OT      Nutrition       Dietician        Diabetes Education      PO   X  TF     TPN     NPO   # days     Other        Anticipated discharge plans:   LTACH:        SNF/Rehab:                  Home Health Care:           Outpatient Wound Center:            Self/Family Care:      X  Other:                  Vac Discharge Needs:   Not Applicable Pt not on a wound vac:     X  Regular Vac while inpatient, alternative dressing at DC:        Regular Vac in use and continued at DC:            Reg. Vac w/ Skin Sub/Biologic in use. Will need to be changed 2x wkly:      Veraflo Vac while inpatient, ok to transition to Regular Vac on Discharge:           Veraflo Vac while inpatient, will need to remain on Veraflo Vac upon discharge:

## 2022-11-15 NOTE — PROGRESS NOTES
Inpatient Anticoagulation Service Note    Date: 11/15/2022    Reason for Anticoagulation:  (Left Atrial Appendage Thrombus)   Target INR: 2.0 to 3.0         Hemoglobin Value: (!) 11.5  Hematocrit Value: (!) 34.2  Lab Platelet Value: (!) 88    INR from last 7 days       Date/Time INR Value    11/14/22 1254 1.04    11/13/22 0945 1.1    11/12/22 1627 1.01          Dose from last 7 days       Date/Time Dose (mg)    11/15/22 1408 5          Significant Interactions: Not Applicable  Bridge Therapy: Yes  Date of Last VTE Event: 11/14/22  Bridge Therapy Start Date: 11/15/22  Days of Overlap Therapy: 1   INR Value Greater than 2 Prior to Discontinuation of Parenteral Anticoagulation: Not Applicable   Reversal Agent Administered: Not Applicable  Comments: New start warfarin > DOAC per cardiology preference and insurance reasons.  Continue with lovenox bridge until therapeutic INR.  Education Material Provided?: No  Pharmacist suggested discharge dosing: warfarin 5 mg and INR check within 48 hours of discharge.     Amy Louie, PharmD  [id number]

## 2022-11-15 NOTE — CARE PLAN
The patient is Stable - Low risk of patient condition declining or worsening    Shift Goals  Clinical Goals: reduce chest pain, mobilze, monitor ECG  Patient Goals: control pain and walk  Family Goals: MIGUEL    Progress made toward(s) clinical / shift goals:    Problem: Pain - Standard  Goal: Alleviation of pain or a reduction in pain to the patient’s comfort goal  Outcome: Progressing     Problem: Knowledge Deficit - Standard  Goal: Patient and family/care givers will demonstrate understanding of plan of care, disease process/condition, diagnostic tests and medications  Outcome: Progressing     Problem: Psychosocial  Goal: Patient's level of anxiety will decrease  Outcome: Progressing  Goal: Patient's ability to verbalize feelings about condition will improve  Outcome: Progressing  Goal: Patient's ability to re-evaluate and adapt role responsibilities will improve  Outcome: Progressing  Goal: Patient and family will demonstrate ability to cope with life altering diagnosis and/or procedure  Outcome: Progressing  Goal: Spiritual and cultural needs incorporated into hospitalization  Outcome: Progressing     Problem: Communication  Goal: The ability to communicate needs accurately and effectively will improve  Outcome: Progressing     Problem: Discharge Barriers/Planning  Goal: Patient's continuum of care needs are met  Outcome: Progressing     Problem: Hemodynamics  Goal: Patient's hemodynamics, fluid balance and neurologic status will be stable or improve  Outcome: Progressing     Problem: Respiratory  Goal: Patient will achieve/maintain optimum respiratory ventilation and gas exchange  Outcome: Progressing     Problem: Chest Tube Management  Goal: Complications related to chest tube will be avoided or minimized  Outcome: Progressing     Problem: Fluid Volume  Goal: Fluid volume balance will be maintained  Outcome: Progressing     Problem: Mechanical Ventilation  Goal: Safe management of artificial airway and  ventilation  Outcome: Progressing  Goal: Successful weaning off mechanical ventilator, spontaneously maintains adequate gas exchange  Outcome: Progressing  Goal: Patient will be able to express needs and understand communication  Outcome: Progressing     Problem: Dysphagia  Goal: Dysphagia will improve  Outcome: Progressing     Problem: Risk for Aspiration  Goal: Patient's risk for aspiration will be absent or decrease  Outcome: Progressing     Problem: Nutrition  Goal: Patient's nutritional and fluid intake will be adequate or improve  Outcome: Progressing  Goal: Enteral nutrition will be maintained or improve  Outcome: Progressing  Goal: Enteral nutrition will be maintained or improve  Outcome: Progressing     Problem: Urinary Elimination  Goal: Establish and maintain regular urinary output  Outcome: Progressing     Problem: Bowel Elimination  Goal: Establish and maintain regular bowel function  Outcome: Progressing     Problem: Gastrointestinal Irritability  Goal: Nausea and vomiting will be absent or improve  Outcome: Progressing  Goal: Diarrhea will be absent or improved  Outcome: Progressing     Problem: Rectal Tube  Goal: Fecal output will be contained and skin will remain free from irritation  Outcome: Progressing     Problem: Mobility  Goal: Patient's capacity to carry out activities will improve  Outcome: Progressing     Problem: Self Care  Goal: Patient will have the ability to perform ADLs independently or with assistance (bathe, groom, dress, toilet and feed)  Outcome: Progressing     Problem: Infection - Standard  Goal: Patient will remain free from infection  Outcome: Progressing     Problem: Wound/ / Incision Healing  Goal: Patient's wound/surgical incision will decrease in size and heals properly  Outcome: Progressing       Patient is not progressing towards the following goals:

## 2022-11-15 NOTE — PROGRESS NOTES
At 2330 Patients room smelled like mariajuana and patient admitted to having a small bag and a pipe in his backpack.  Patient stated he is not smoking in the room.  Patient was informed that it is not allowed to smoke in the room.  Security was called to talk with the patient about having mariajuana on his person in the room.  Security stated that he is allowed to keep the marijuana in his backpack and pipe but is not allowed to smoke it in his room and if he does they will have to remove the substance. Patient also stated that if he has to get rid of his marijuana then he will leave A.  Patient stated that he smoked 2 days ago and he uses marijuana for pain.    Charge nurse updated and notified.

## 2022-11-15 NOTE — PROGRESS NOTES
Monitor summary:        Rhythm: SR   Rate: 63-80  Ectopy: (r)PVC, (r)COUP, 1st degree HB  Measurements: .25./.08/.28                                                12hr chart check

## 2022-11-16 PROBLEM — D69.6 THROMBOCYTOPENIA (HCC): Status: ACTIVE | Noted: 2022-11-16

## 2022-11-16 LAB
ALBUMIN SERPL BCP-MCNC: 4.1 G/DL (ref 3.2–4.9)
ALBUMIN/GLOB SERPL: 1.5 G/DL
ALP SERPL-CCNC: 126 U/L (ref 30–99)
ALT SERPL-CCNC: 72 U/L (ref 2–50)
ANION GAP SERPL CALC-SCNC: 11 MMOL/L (ref 7–16)
APPEARANCE UR: CLEAR
AST SERPL-CCNC: 58 U/L (ref 12–45)
BASOPHILS # BLD AUTO: 0.3 % (ref 0–1.8)
BASOPHILS # BLD AUTO: 0.3 % (ref 0–1.8)
BASOPHILS # BLD: 0.01 K/UL (ref 0–0.12)
BASOPHILS # BLD: 0.01 K/UL (ref 0–0.12)
BILIRUB SERPL-MCNC: 0.5 MG/DL (ref 0.1–1.5)
BILIRUB UR QL STRIP.AUTO: NEGATIVE
BUN SERPL-MCNC: 16 MG/DL (ref 8–22)
CALCIUM SERPL-MCNC: 9.2 MG/DL (ref 8.5–10.5)
CHLORIDE SERPL-SCNC: 99 MMOL/L (ref 96–112)
CO2 SERPL-SCNC: 26 MMOL/L (ref 20–33)
COLOR UR: YELLOW
CREAT SERPL-MCNC: 0.7 MG/DL (ref 0.5–1.4)
EOSINOPHIL # BLD AUTO: 0 K/UL (ref 0–0.51)
EOSINOPHIL # BLD AUTO: 0.01 K/UL (ref 0–0.51)
EOSINOPHIL NFR BLD: 0 % (ref 0–6.9)
EOSINOPHIL NFR BLD: 0.3 % (ref 0–6.9)
ERYTHROCYTE [DISTWIDTH] IN BLOOD BY AUTOMATED COUNT: 51.3 FL (ref 35.9–50)
ERYTHROCYTE [DISTWIDTH] IN BLOOD BY AUTOMATED COUNT: 52.8 FL (ref 35.9–50)
GFR SERPLBLD CREATININE-BSD FMLA CKD-EPI: 114 ML/MIN/1.73 M 2
GLOBULIN SER CALC-MCNC: 2.7 G/DL (ref 1.9–3.5)
GLUCOSE SERPL-MCNC: 111 MG/DL (ref 65–99)
GLUCOSE UR STRIP.AUTO-MCNC: NEGATIVE MG/DL
HCT VFR BLD AUTO: 35.7 % (ref 42–52)
HCT VFR BLD AUTO: 39 % (ref 42–52)
HGB BLD-MCNC: 12 G/DL (ref 14–18)
HGB BLD-MCNC: 12.9 G/DL (ref 14–18)
IMM GRANULOCYTES # BLD AUTO: 0.01 K/UL (ref 0–0.11)
IMM GRANULOCYTES # BLD AUTO: 0.01 K/UL (ref 0–0.11)
IMM GRANULOCYTES NFR BLD AUTO: 0.3 % (ref 0–0.9)
IMM GRANULOCYTES NFR BLD AUTO: 0.3 % (ref 0–0.9)
INR PPP: 1.11 (ref 0.87–1.13)
KETONES UR STRIP.AUTO-MCNC: NEGATIVE MG/DL
LEUKOCYTE ESTERASE UR QL STRIP.AUTO: NEGATIVE
LYMPHOCYTES # BLD AUTO: 0.47 K/UL (ref 1–4.8)
LYMPHOCYTES # BLD AUTO: 0.54 K/UL (ref 1–4.8)
LYMPHOCYTES NFR BLD: 13.4 % (ref 22–41)
LYMPHOCYTES NFR BLD: 17.1 % (ref 22–41)
MCH RBC QN AUTO: 34.8 PG (ref 27–33)
MCH RBC QN AUTO: 35.8 PG (ref 27–33)
MCHC RBC AUTO-ENTMCNC: 33.1 G/DL (ref 33.7–35.3)
MCHC RBC AUTO-ENTMCNC: 33.6 G/DL (ref 33.7–35.3)
MCV RBC AUTO: 105.1 FL (ref 81.4–97.8)
MCV RBC AUTO: 106.6 FL (ref 81.4–97.8)
MICRO URNS: NORMAL
MONOCYTES # BLD AUTO: 0.33 K/UL (ref 0–0.85)
MONOCYTES # BLD AUTO: 0.34 K/UL (ref 0–0.85)
MONOCYTES NFR BLD AUTO: 10.4 % (ref 0–13.4)
MONOCYTES NFR BLD AUTO: 9.7 % (ref 0–13.4)
NEUTROPHILS # BLD AUTO: 2.26 K/UL (ref 1.82–7.42)
NEUTROPHILS # BLD AUTO: 2.67 K/UL (ref 1.82–7.42)
NEUTROPHILS NFR BLD: 71.6 % (ref 44–72)
NEUTROPHILS NFR BLD: 76.3 % (ref 44–72)
NITRITE UR QL STRIP.AUTO: NEGATIVE
NRBC # BLD AUTO: 0 K/UL
NRBC # BLD AUTO: 0 K/UL
NRBC BLD-RTO: 0 /100 WBC
NRBC BLD-RTO: 0 /100 WBC
PH UR STRIP.AUTO: 7.5 [PH] (ref 5–8)
PLATELET # BLD AUTO: 58 K/UL (ref 164–446)
PLATELET # BLD AUTO: 88 K/UL (ref 164–446)
PMV BLD AUTO: 10.8 FL (ref 9–12.9)
PMV BLD AUTO: 12.4 FL (ref 9–12.9)
POTASSIUM SERPL-SCNC: 4.5 MMOL/L (ref 3.6–5.5)
PROT SERPL-MCNC: 6.8 G/DL (ref 6–8.2)
PROT UR QL STRIP: NEGATIVE MG/DL
PROTHROMBIN TIME: 14.1 SEC (ref 12–14.6)
RBC # BLD AUTO: 3.35 M/UL (ref 4.7–6.1)
RBC # BLD AUTO: 3.71 M/UL (ref 4.7–6.1)
RBC UR QL AUTO: NEGATIVE
SODIUM SERPL-SCNC: 136 MMOL/L (ref 135–145)
SP GR UR STRIP.AUTO: 1.01
UROBILINOGEN UR STRIP.AUTO-MCNC: 0.2 MG/DL
WBC # BLD AUTO: 3.2 K/UL (ref 4.8–10.8)
WBC # BLD AUTO: 3.5 K/UL (ref 4.8–10.8)

## 2022-11-16 PROCEDURE — 85610 PROTHROMBIN TIME: CPT

## 2022-11-16 PROCEDURE — A9270 NON-COVERED ITEM OR SERVICE: HCPCS

## 2022-11-16 PROCEDURE — 700102 HCHG RX REV CODE 250 W/ 637 OVERRIDE(OP)

## 2022-11-16 PROCEDURE — 85025 COMPLETE CBC W/AUTO DIFF WBC: CPT

## 2022-11-16 PROCEDURE — 700102 HCHG RX REV CODE 250 W/ 637 OVERRIDE(OP): Performed by: STUDENT IN AN ORGANIZED HEALTH CARE EDUCATION/TRAINING PROGRAM

## 2022-11-16 PROCEDURE — A9270 NON-COVERED ITEM OR SERVICE: HCPCS | Performed by: NURSE PRACTITIONER

## 2022-11-16 PROCEDURE — 80053 COMPREHEN METABOLIC PANEL: CPT

## 2022-11-16 PROCEDURE — A9270 NON-COVERED ITEM OR SERVICE: HCPCS | Performed by: INTERNAL MEDICINE

## 2022-11-16 PROCEDURE — 770020 HCHG ROOM/CARE - TELE (206)

## 2022-11-16 PROCEDURE — A9270 NON-COVERED ITEM OR SERVICE: HCPCS | Performed by: STUDENT IN AN ORGANIZED HEALTH CARE EDUCATION/TRAINING PROGRAM

## 2022-11-16 PROCEDURE — 700102 HCHG RX REV CODE 250 W/ 637 OVERRIDE(OP): Performed by: INTERNAL MEDICINE

## 2022-11-16 PROCEDURE — 99232 SBSQ HOSP IP/OBS MODERATE 35: CPT | Mod: GC | Performed by: INTERNAL MEDICINE

## 2022-11-16 PROCEDURE — 700102 HCHG RX REV CODE 250 W/ 637 OVERRIDE(OP): Performed by: NURSE PRACTITIONER

## 2022-11-16 PROCEDURE — 700111 HCHG RX REV CODE 636 W/ 250 OVERRIDE (IP): Performed by: NURSE PRACTITIONER

## 2022-11-16 PROCEDURE — 36415 COLL VENOUS BLD VENIPUNCTURE: CPT

## 2022-11-16 RX ADMIN — ATORVASTATIN CALCIUM 80 MG: 80 TABLET, FILM COATED ORAL at 16:56

## 2022-11-16 RX ADMIN — Medication 100 MG: at 20:54

## 2022-11-16 RX ADMIN — CLOPIDOGREL BISULFATE 75 MG: 75 TABLET ORAL at 05:30

## 2022-11-16 RX ADMIN — Medication 100 MG: at 15:39

## 2022-11-16 RX ADMIN — TRAMADOL HYDROCHLORIDE 50 MG: 50 TABLET, COATED ORAL at 10:54

## 2022-11-16 RX ADMIN — FUROSEMIDE 20 MG: 20 TABLET ORAL at 15:40

## 2022-11-16 RX ADMIN — SENNOSIDES AND DOCUSATE SODIUM 2 TABLET: 50; 8.6 TABLET ORAL at 05:29

## 2022-11-16 RX ADMIN — GABAPENTIN 100 MG: 100 CAPSULE ORAL at 05:29

## 2022-11-16 RX ADMIN — LISINOPRIL 5 MG: 5 TABLET ORAL at 05:30

## 2022-11-16 RX ADMIN — WARFARIN SODIUM 5 MG: 5 TABLET ORAL at 17:52

## 2022-11-16 RX ADMIN — GABAPENTIN 100 MG: 100 CAPSULE ORAL at 13:11

## 2022-11-16 RX ADMIN — FUROSEMIDE 20 MG: 20 TABLET ORAL at 05:29

## 2022-11-16 RX ADMIN — ACETAMINOPHEN 325 MG: 325 TABLET, FILM COATED ORAL at 13:11

## 2022-11-16 RX ADMIN — TRAMADOL HYDROCHLORIDE 50 MG: 50 TABLET, COATED ORAL at 20:54

## 2022-11-16 RX ADMIN — SPIRONOLACTONE 12.5 MG: 25 TABLET ORAL at 05:30

## 2022-11-16 RX ADMIN — ENOXAPARIN SODIUM 60 MG: 60 INJECTION SUBCUTANEOUS at 05:32

## 2022-11-16 RX ADMIN — ENOXAPARIN SODIUM 60 MG: 60 INJECTION SUBCUTANEOUS at 16:56

## 2022-11-16 RX ADMIN — ACETAMINOPHEN 325 MG: 325 TABLET, FILM COATED ORAL at 05:32

## 2022-11-16 RX ADMIN — Medication 100 MG: at 10:52

## 2022-11-16 RX ADMIN — Medication 5 MG: at 20:53

## 2022-11-16 RX ADMIN — LEVOTHYROXINE SODIUM 125 MCG: 0.12 TABLET ORAL at 05:30

## 2022-11-16 RX ADMIN — GABAPENTIN 100 MG: 100 CAPSULE ORAL at 16:56

## 2022-11-16 ASSESSMENT — ENCOUNTER SYMPTOMS
NAUSEA: 0
DIZZINESS: 0
ABDOMINAL PAIN: 0
CHILLS: 0
PALPITATIONS: 0
BLOOD IN STOOL: 0
MYALGIAS: 0
HEADACHES: 0
FEVER: 0
SHORTNESS OF BREATH: 0

## 2022-11-16 ASSESSMENT — PAIN DESCRIPTION - PAIN TYPE: TYPE: CHRONIC PAIN

## 2022-11-16 NOTE — PROGRESS NOTES
Inpatient Anticoagulation Service Note    Date: 11/16/2022    Reason for Anticoagulation:  (Left Atrial Appendage Thrombus)   Target INR: 2.0 to 3.0  Hemoglobin Value: (!) 12.9  Hematocrit Value: (!) 39  Lab Platelet Value: (!) 88 (Results confirmed by repeat testing.)    INR from last 7 days       Date/Time INR Value    11/16/22 1128 1.11    11/14/22 1254 1.04    11/13/22 0945 1.1    11/12/22 1627 1.01          Dose from last 7 days       Date/Time Dose (mg)    11/16/22 1339 5    11/15/22 1408 5          Significant Interactions: Not Applicable  Bridge Therapy: Yes  Date of Last VTE Event: 11/14/22  Bridge Therapy Start Date: 11/15/22  Days of Overlap Therapy: 2   INR Value Greater than 2 Prior to Discontinuation of Parenteral Anticoagulation: Not Applicable   Reversal Agent Administered: Not Applicable  Comments: New start warfarin > DOAC per cardiology preference and insurance reasons.  Continue with lovenox bridge until therapeutic INR.  Education Material Provided?: Yes  Pharmacist suggested discharge dosing: warfarin 5 mg and INR check within 48 hours of discharge.     Amy Louie, ZoraidaD  [phone]

## 2022-11-16 NOTE — CARE PLAN
The patient is Stable - Low risk of patient condition declining or worsening    Shift Goals  Clinical Goals: manage pain  Patient Goals: rest, control pain  Family Goals: MIGUEL    Progress made toward(s) clinical / shift goals:    Problem: Pain - Standard  Goal: Alleviation of pain or a reduction in pain to the patient’s comfort goal  Outcome: Progressing     Problem: Knowledge Deficit - Standard  Goal: Patient and family/care givers will demonstrate understanding of plan of care, disease process/condition, diagnostic tests and medications  Outcome: Progressing     Problem: Self Care  Goal: Patient will have the ability to perform ADLs independently or with assistance (bathe, groom, dress, toilet and feed)  Outcome: Progressing       Patient is not progressing towards the following goals:

## 2022-11-16 NOTE — PROGRESS NOTES
Assumed care of pt, bedside report received from Ximena MARR. Pt A&Ox 4, no complaints of pain at this time. Updated on POC, call light in reach, and fall precautions in place. Bed locked and in lowest position. Pt instructed to call for assistance before getting out of bed. All questions answered, no other needs at this time.

## 2022-11-17 PROBLEM — Z95.3 HISTORY OF AORTIC VALVE REPLACEMENT WITH BIOPROSTHETIC VALVE: Status: ACTIVE | Noted: 2022-11-17

## 2022-11-17 LAB
INR PPP: 1.17 (ref 0.87–1.13)
PROTHROMBIN TIME: 14.7 SEC (ref 12–14.6)

## 2022-11-17 PROCEDURE — 36415 COLL VENOUS BLD VENIPUNCTURE: CPT

## 2022-11-17 PROCEDURE — 700111 HCHG RX REV CODE 636 W/ 250 OVERRIDE (IP): Performed by: NURSE PRACTITIONER

## 2022-11-17 PROCEDURE — 700102 HCHG RX REV CODE 250 W/ 637 OVERRIDE(OP): Performed by: INTERNAL MEDICINE

## 2022-11-17 PROCEDURE — 700102 HCHG RX REV CODE 250 W/ 637 OVERRIDE(OP): Performed by: NURSE PRACTITIONER

## 2022-11-17 PROCEDURE — 700102 HCHG RX REV CODE 250 W/ 637 OVERRIDE(OP): Performed by: STUDENT IN AN ORGANIZED HEALTH CARE EDUCATION/TRAINING PROGRAM

## 2022-11-17 PROCEDURE — 770020 HCHG ROOM/CARE - TELE (206)

## 2022-11-17 PROCEDURE — A9270 NON-COVERED ITEM OR SERVICE: HCPCS | Performed by: NURSE PRACTITIONER

## 2022-11-17 PROCEDURE — A9270 NON-COVERED ITEM OR SERVICE: HCPCS

## 2022-11-17 PROCEDURE — A9270 NON-COVERED ITEM OR SERVICE: HCPCS | Performed by: INTERNAL MEDICINE

## 2022-11-17 PROCEDURE — 99232 SBSQ HOSP IP/OBS MODERATE 35: CPT | Mod: GC | Performed by: INTERNAL MEDICINE

## 2022-11-17 PROCEDURE — 700102 HCHG RX REV CODE 250 W/ 637 OVERRIDE(OP)

## 2022-11-17 PROCEDURE — A9270 NON-COVERED ITEM OR SERVICE: HCPCS | Performed by: STUDENT IN AN ORGANIZED HEALTH CARE EDUCATION/TRAINING PROGRAM

## 2022-11-17 PROCEDURE — 85610 PROTHROMBIN TIME: CPT

## 2022-11-17 RX ADMIN — CLOPIDOGREL BISULFATE 75 MG: 75 TABLET ORAL at 05:47

## 2022-11-17 RX ADMIN — TRAMADOL HYDROCHLORIDE 50 MG: 50 TABLET, COATED ORAL at 21:01

## 2022-11-17 RX ADMIN — TRAMADOL HYDROCHLORIDE 50 MG: 50 TABLET, COATED ORAL at 02:57

## 2022-11-17 RX ADMIN — TRAMADOL HYDROCHLORIDE 50 MG: 50 TABLET, COATED ORAL at 16:02

## 2022-11-17 RX ADMIN — APIXABAN 10 MG: 5 TABLET, FILM COATED ORAL at 17:55

## 2022-11-17 RX ADMIN — SENNOSIDES AND DOCUSATE SODIUM 2 TABLET: 50; 8.6 TABLET ORAL at 17:55

## 2022-11-17 RX ADMIN — ATORVASTATIN CALCIUM 80 MG: 80 TABLET, FILM COATED ORAL at 17:55

## 2022-11-17 RX ADMIN — LISINOPRIL 5 MG: 5 TABLET ORAL at 05:47

## 2022-11-17 RX ADMIN — FUROSEMIDE 20 MG: 20 TABLET ORAL at 05:47

## 2022-11-17 RX ADMIN — SPIRONOLACTONE 12.5 MG: 25 TABLET ORAL at 05:47

## 2022-11-17 RX ADMIN — Medication 100 MG: at 21:01

## 2022-11-17 RX ADMIN — FUROSEMIDE 20 MG: 20 TABLET ORAL at 16:02

## 2022-11-17 RX ADMIN — LEVOTHYROXINE SODIUM 125 MCG: 0.12 TABLET ORAL at 05:47

## 2022-11-17 RX ADMIN — GABAPENTIN 100 MG: 100 CAPSULE ORAL at 13:12

## 2022-11-17 RX ADMIN — GABAPENTIN 100 MG: 100 CAPSULE ORAL at 17:55

## 2022-11-17 RX ADMIN — SENNOSIDES AND DOCUSATE SODIUM 2 TABLET: 50; 8.6 TABLET ORAL at 05:53

## 2022-11-17 RX ADMIN — METOPROLOL SUCCINATE 25 MG: 25 TABLET, EXTENDED RELEASE ORAL at 05:47

## 2022-11-17 RX ADMIN — GABAPENTIN 100 MG: 100 CAPSULE ORAL at 05:47

## 2022-11-17 RX ADMIN — Medication 5 MG: at 21:01

## 2022-11-17 RX ADMIN — ENOXAPARIN SODIUM 60 MG: 60 INJECTION SUBCUTANEOUS at 05:47

## 2022-11-17 RX ADMIN — Medication 100 MG: at 08:24

## 2022-11-17 RX ADMIN — Medication 100 MG: at 16:02

## 2022-11-17 ASSESSMENT — ENCOUNTER SYMPTOMS
HEADACHES: 0
BLOOD IN STOOL: 0
SHORTNESS OF BREATH: 0
FEVER: 0
NAUSEA: 0
ABDOMINAL PAIN: 0
CHILLS: 0
PALPITATIONS: 0
MYALGIAS: 0
DIZZINESS: 0

## 2022-11-17 NOTE — ASSESSMENT & PLAN NOTE
Platelets downtrended 88 to 58. Repeat CBC showed platelets at 88.   Low suspicion for HIT.   Plan to discharge with apixaban

## 2022-11-17 NOTE — PROGRESS NOTES
Pt ambulated around the unit with RN / FWW , denies cp or sob . Tolerated well .     Orders for ambulation TID

## 2022-11-17 NOTE — CONSULTS
"Reason for PC Consult: Advance Care Planning    Consulted by: Dr. Maloney    Assessment:  General: Kristopher presented to the ED 11/12 with complaints of chest pain x2 hrs, dizziness, and shortness of breath. Admitted for NSTEMI with elevated troponin, and EKG with T wave inversion. 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    PMH: Guillain [place], 3 open heart surgeries, multiple valvular surgeries, and heart cath.     Social:  Kristopher is a single 46 y.o. male. He travels between his niece's (Alejandrina Haynes) home who lives in [place], [place] and his sister's (Magy Haynes) home in [place], [place].  He used to work in the [employer] at [employer]. He enjoys drawing/coloring cars. Kristopher states he is independent in ADLs/IADLS and ambulated with a FWW.     Consults: Cardiology    Dyspnea: No  Last BM: 11/16/22    Pain: No    Depression: No   Dementia: Unable to Determine       Spiritual:  Is Catholic or spirituality important for coping with this illness? Yes   Has a  or spiritual provider visit been requested? Yes    Palliative Performance Scale: 60%    Advance Directive: None. PC RN assisted pt completion of an Advance Directive at this encounter naming Alejandrina Haynes as his DPOA-HC and Magy Rubi as first alternative -in-fact. Under statements of desires, patient chose the his life be prolonged to the greatest extent possible without regard to his condition, the chances he has for recovery or long-term survival. Certificate of competency signed by Dr. Harrington. Document awaiting notary tomorrow.   DPOA: Yes, Alejandrina Haynes.    POLST: No      Code Status: Full. Discussed code status and educated pt about term.  Described the measures employed in a full code including defibrillation, CPR, and intubation. Kristopher states that his death is, \"God's will\" and if I choose not to be resuscitated that, \"it might change if I go to heaven or to hell\". " " Further, Kristopher states he has been resuscitated in the past and on a ventilator with T&G; therefore he wishes to have the same interventions again if needed. Expressed concern that due to patient's advanced age and multiple co-morbidities, he may not endure invasive code measures well, and even if he was successfully resuscitated, he may come out of it with significantly reduced quality of life.  Kristopher verbalized understanding.  However, he wishes to remain Full code at this time.    Outcome:  0830:Consult received and EMR reviewed; case discussed with Dr. Harrington and updates appreciated.    1000: Phone call to  services (see  services flow sheet). Introduced self and role of Palliative Care.  Assessed pt's understanding of their current medical status, overall health picture, and options for future care. 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 He also has had, \"many heart problems.\" Pt expressed good understanding of acute situation and chronic issues.    Kristopher states he is feeling,\"good\" today and has no pain.     Explored pt's values, beliefs, and preferences in order to identify GOC. Pt's sources of strength are his will to live and his spiritual beliefs. Further the pt states that he never wanted to, \"be a burden to anyone\" so he keeps fighting on his own. His niece and sister also motivate him to, \"keep going.\" Pt's greatest concerns are getting to [place], [place] to stay with his niece. PC RN queried if pt has ever discussed healthcare wishes and values or completed an Advance Directive in the past. Pt has not filled out this documents but would like to now (see advance directive section).     Active listening, reflection, reminiscing, validation & normalization, empathic support and therapeutic touch utilized throughout this encounter.  All questions answered.  PC contact information " given. Acknowledged pt's courage in having this discussion. He expressed appreciation for support.    Updated: Dr. Harrington    Plan: Palliative care to continue to follow, provide support, and help facilitate decision-making as clinical picture evolves.    Thank you for allowing Palliative Care to participate in this patient's care. Please feel free to call [phone] with any questions or concerns.

## 2022-11-17 NOTE — PROGRESS NOTES
[facility] Internal Medicine Daily Progress Note    Date of Service  11/16/2022    [facility] Team: R IM Green Team   Attending: Harjit Noonan M.d.  Senior Resident: Dr. Harrington  Intern:  Dr. Maloney  Contact Number: [phone]    Chief Complaint  Kristopher Leroy is a 46 y.o. male admitted 11/12/2022 with chest pain    ID  Mr. Kristopher Leroy is 45 yo male hx of Guillain-[place], hypothyroidism, valvular dz/CAD, hx of open heart surgery  with bioprosthetic aortic valve on DAPT who presented on 11/12/2022 with chest pain, cath results no evidence of ACS and further evaluation with CARLOS with concerns for bioprosthetic MV/AV dysfunction, left atrial appendage thrombus, and echogenic material on MV concern for endocarditis. Recommendation from cardiothoracic surgery and cardiology for f/u with [facility] for further management for valves, continue with plavix w/o aspirin and bridge heparin to warfarin. Endocarditis workup with procal negative and BCX NGTD.    Hospital Course  No notes on file    Interval Problem Update  No events overnight. Patient's platelets trended down while on warfarin and lovenox. Repeating patient's CBC.   Otherwise no complaints.     I have discussed this patient's plan of care and discharge plan at IDT rounds today with Case Management, Nursing, Nursing leadership, and other members of the IDT team.    Consultants/Specialty  cardiology    Code Status  Full Code    Disposition  Patient is not medically cleared for discharge.   Anticipate discharge to to home with close outpatient follow-up.  I have placed the appropriate orders for post-discharge needs.    Review of Systems  Review of Systems   Constitutional:  Negative for chills, fever and malaise/fatigue.   Respiratory:  Negative for shortness of breath.    Cardiovascular:  Positive for chest pain. Negative for palpitations and leg swelling.   Gastrointestinal:  Negative for abdominal pain, blood in stool and nausea.   Genitourinary:  Negative for  hematuria.   Musculoskeletal:  Negative for myalgias.   Neurological:  Negative for dizziness and headaches.      Physical Exam  Temp:  [36.5 °C (97.7 °F)-37.1 °C (98.8 °F)] 36.5 °C (97.7 °F)  Pulse:  [58-72] 67  Resp:  [16-18] 18  BP: ()/(43-62) 101/62  SpO2:  [96 %-100 %] 100 %    Physical Exam  Constitutional:       General: He is not in acute distress.     Appearance: Normal appearance. He is ill-appearing.      Comments: Comfortable in bed, able to use incentive spirometer well without exacerbation of chest pain   HENT:      Head: Normocephalic and atraumatic.      Right Ear: External ear normal.      Left Ear: External ear normal.      Nose: Nose normal.      Mouth/Throat:      Mouth: Mucous membranes are moist.   Eyes:      General: No scleral icterus.        Right eye: No discharge.         Left eye: No discharge.      Extraocular Movements: Extraocular movements intact.      Conjunctiva/sclera: Conjunctivae normal.      Comments: Pupils equal   Cardiovascular:      Rate and Rhythm: Normal rate and regular rhythm.      Pulses: Normal pulses.      Heart sounds: Murmur heard.      Comments: Sternotomy scar, left upper chest scar from past pacemaker  Systolic murmur 3/6 aortic region  Pulmonary:      Effort: Pulmonary effort is normal. No respiratory distress.      Breath sounds: Normal breath sounds. No wheezing or rales.   Abdominal:      General: Abdomen is flat. Bowel sounds are normal. There is no distension.      Palpations: Abdomen is soft.   Musculoskeletal:         General: Deformity present. No swelling or tenderness. Normal range of motion.      Cervical back: Normal range of motion.      Right lower leg: No edema.      Left lower leg: No edema.      Comments: Bilateral hands fingers are contracted  Left toes removed   Skin:     General: Skin is warm and dry.      Coloration: Skin is not jaundiced.   Neurological:      General: No focal deficit present.      Mental Status: He is alert and  oriented to person, place, and time.   Psychiatric:         Mood and Affect: Mood normal.         Behavior: Behavior normal.         Thought Content: Thought content normal.         Judgment: Judgment normal.       Fluids    Intake/Output Summary (Last 24 hours) at 11/16/2022 1837  Last data filed at 11/16/2022 1448  Gross per 24 hour   Intake 370 ml   Output 1800 ml   Net -1430 ml         Laboratory  Recent Labs     11/15/22  0238 11/16/22  0342 11/16/22  1128   WBC 3.9* 3.2* 3.5*   RBC 3.26* 3.35* 3.71*   HEMOGLOBIN 11.5* 12.0* 12.9*   HEMATOCRIT 34.2* 35.7* 39.0*   .9* 106.6* 105.1*   MCH 35.3* 35.8* 34.8*   MCHC 33.6* 33.6* 33.1*   RDW 51.9* 52.8* 51.3*   PLATELETCT 88* 58* 88*   MPV 10.5 12.4 10.8       Recent Labs     11/14/22  0212 11/15/22  0238 11/16/22  0342   SODIUM 137 139 136   POTASSIUM 4.5 4.0 4.5   CHLORIDE 103 104 99   CO2 22 26 26   GLUCOSE 112* 108* 111*   BUN 23* 19 16   CREATININE 0.80 0.67 0.70   CALCIUM 8.8 8.6 9.2       Recent Labs     11/14/22  1254 11/16/22  1128   APTT 30.5  --    INR 1.04 1.11                   Imaging  EC-CARLOS W/O CONT   Final Result      EC-ECHOCARDIOGRAM COMPLETE W/O CONT   Final Result      CT-CTA CHEST PULMONARY ARTERY W/ RECONS   Final Result         1. No CT evidence of pulmonary embolism.      2. Mild hazy groundglass opacities throughout both lungs, right slightly more than left, likely mild pulmonary edema.      3. Patchy bibasilar atelectasis.         US-RUQ   Final Result      1. Status post cholecystectomy. The common bile duct is obscured by overlying bowel gas.   2. Homogeneous appearance of the liver with no hepatic mass.      DX-CHEST-PORTABLE (1 VIEW)   Final Result      1.  Mildly enlarged cardiac silhouette with vascular congestion.      CL-LEFT HEART CATHETERIZATION WITH POSSIBLE INTERVENTION    (Results Pending)          Assessment/Plan  Problem Representation:  Mr. Kristopher Vernal Cano is 45 yo male hx of Guillain-[place], hypothyroidism, valvular  dz/CAD, hx of open heart surgery  with bioprosthetic aortic valve on DAPT who presented on 11/12/2022 with chest pain, cath results no evidence of ACS and further evaluation with [facility] with concerns for bioprosthetic MV/AV dysfunction, left atrial appendage thrombus, and echogenic material on MV concern for endocarditis. Recommendation from cardiothoracic surgery and cardiology for f/u with [facility] for further management for valves, continue with plavix w/o aspirin and bridge heparin to warfarin. Endocarditis workup with procal negative and BCX NGTD.    * NSTEMI (non-ST elevated myocardial infarction) (HCC)- (present on admission)  Assessment & Plan  Ruled out by Cath results no ACS      Thrombocytopenia (HCC)  Assessment & Plan  Platelets downtrended 88 to 58. Repeat CBC showed platelets at 88.   Low suspicion for HIT.   Plan to discharge patient tomorrow with Lovenox and Warfarin.     Hypothyroidism  Assessment & Plan  TSH elevated, T4 wnl  -Continue levothyroxine 125 mcg daily      Alcohol consumption binge drinking  Assessment & Plan  Binged drinking >5 drinks 2 weeks ago, last drink 1 beer 2 days ago  -low threshold CIWA  -seizure precaution    Macrocytic anemia  Assessment & Plan  H/H and MCV evident of macrocytic anemia  -B12, folate, Fe panel, ferriten all wnl    Chest pain  Assessment & Plan  Chest pain and elevated troponin that has now been steady in 50s  History of aortic bioprosthetic valve replacement on DAPT, per patient replaced three times   Hx of pacemaker placement with infection requiring removal, has a loop recorder implanted  EKG showed T inversion, no prior records to compare if new. Obtaining records from [facility] and [facility]. CARLOS EF 20% with concerns for bioprosthetic MV/AV dysfunction, left atrial appendage thrombus now on heparin gtt, and echogenic material on MV concern for endocarditis.  -Cardiology following, apprec recs, CTS was consulted for valvular dysfunction:  Recommendation for f/u with [facility] for further management for valves, continue with plavix w/o aspirin and bridge heparin to warfarin. Endocarditis workup with procal negative and BCX NGTD.  -continue plavix 75mg  -metoprolol SR 25mg  -lisinopril 5mg  -atorvastatin 80mg  -lasix 20mg BID  -Lovenox bridge to Coumadin      Dyslipidemia  Assessment & Plan  Continue atorvastatin 80 mg daily    Elevated liver enzymes  Assessment & Plan  Elevated ALT more than AST, abdominal exam no tenderness.   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 In review of yesterday's note (11/15/2022), there are no changes except as documented above.

## 2022-11-17 NOTE — DISCHARGE PLANNING
Case Management Discharge Planning    Admission Date: 2022  GMLOS: 4.1  ALOS: 5    6-Clicks ADL Score: 22  6-Clicks Mobility Score: 21      Anticipated Discharge Dispo: Discharge Disposition: Discharged to home/self care ()    Medically Clear: Yes-pending attending clearance    Next Steps: Verify if pt has a bus pass.    Barriers to Discharge: Transportation and Homelessness - pt planning on taking a bus to [place].    1330: RNCM received notification that pt's bus ticket is lost.  RNCM inquired to leadership if a bus pass can be purchased; voice communication needs to be verified with accepting family member. DPA retrieved following information: Pt has an  [employer] ticket. RNCM to inquire with [employer] if we can transfer his [employer] ticket to use with tomorrow's date with a hospital note; placing call to [phone] to inquire - currently on hold.    MD team requested PCP in [place] be arranged: schedulers need a physical address. RNCM PC to pt niramon to inquire physical address; no answer, left detailed msg for callback.     1610: RNCM finished call with [employer]; , Cassandra was able to transfer his ticket from 22 to 22. Ticket emailed to myself and printed out and handed to pt. Train departs at 1410; RNCM to give cab voucher and will need DC by 1300 22 to [place] the depot on time.

## 2022-11-17 NOTE — CARE PLAN
The patient is Stable - Low risk of patient condition declining or worsening    Shift Goals  Clinical Goals: monitor pain  Patient Goals: pain control  Family Goals: n/a    Progress made toward(s) clinical / shift goals:    Problem: Pain - Standard  Goal: Alleviation of pain or a reduction in pain to the patient’s comfort goal  Outcome: Progressing     Problem: Knowledge Deficit - Standard  Goal: Patient and family/care givers will demonstrate understanding of plan of care, disease process/condition, diagnostic tests and medications  Outcome: Progressing     Problem: Psychosocial  Goal: Patient's ability to verbalize feelings about condition will improve  Outcome: Progressing     Problem: Communication  Goal: The ability to communicate needs accurately and effectively will improve  Outcome: Progressing     Problem: Wound/ / Incision Healing  Goal: Patient's wound/surgical incision will decrease in size and heals properly  Outcome: Progressing     Problem: Fall Risk  Goal: Patient will remain free from falls  Outcome: Progressing       Patient is not progressing towards the following goals:

## 2022-11-18 ENCOUNTER — PATIENT OUTREACH (OUTPATIENT)
Dept: SCHEDULING | Facility: IMAGING CENTER | Age: 47
End: 2022-11-18

## 2022-11-18 ENCOUNTER — PHARMACY VISIT (OUTPATIENT)
Dept: PHARMACY | Facility: MEDICAL CENTER | Age: 47
End: 2022-11-18
Payer: COMMERCIAL

## 2022-11-18 VITALS
HEIGHT: 67 IN | WEIGHT: 144.18 LBS | TEMPERATURE: 98.4 F | DIASTOLIC BLOOD PRESSURE: 58 MMHG | SYSTOLIC BLOOD PRESSURE: 97 MMHG | RESPIRATION RATE: 20 BRPM | BODY MASS INDEX: 22.63 KG/M2 | HEART RATE: 67 BPM | OXYGEN SATURATION: 99 %

## 2022-11-18 LAB
INR PPP: 1.65 (ref 0.87–1.13)
PROTHROMBIN TIME: 19.1 SEC (ref 12–14.6)

## 2022-11-18 PROCEDURE — 700102 HCHG RX REV CODE 250 W/ 637 OVERRIDE(OP): Performed by: INTERNAL MEDICINE

## 2022-11-18 PROCEDURE — 700102 HCHG RX REV CODE 250 W/ 637 OVERRIDE(OP): Performed by: NURSE PRACTITIONER

## 2022-11-18 PROCEDURE — A9270 NON-COVERED ITEM OR SERVICE: HCPCS | Performed by: NURSE PRACTITIONER

## 2022-11-18 PROCEDURE — 700102 HCHG RX REV CODE 250 W/ 637 OVERRIDE(OP): Performed by: STUDENT IN AN ORGANIZED HEALTH CARE EDUCATION/TRAINING PROGRAM

## 2022-11-18 PROCEDURE — 700102 HCHG RX REV CODE 250 W/ 637 OVERRIDE(OP)

## 2022-11-18 PROCEDURE — A9270 NON-COVERED ITEM OR SERVICE: HCPCS | Performed by: INTERNAL MEDICINE

## 2022-11-18 PROCEDURE — 99238 HOSP IP/OBS DSCHRG MGMT 30/<: CPT | Mod: GC | Performed by: INTERNAL MEDICINE

## 2022-11-18 PROCEDURE — RXMED WILLOW AMBULATORY MEDICATION CHARGE

## 2022-11-18 PROCEDURE — A9270 NON-COVERED ITEM OR SERVICE: HCPCS

## 2022-11-18 PROCEDURE — A9270 NON-COVERED ITEM OR SERVICE: HCPCS | Performed by: STUDENT IN AN ORGANIZED HEALTH CARE EDUCATION/TRAINING PROGRAM

## 2022-11-18 PROCEDURE — 36415 COLL VENOUS BLD VENIPUNCTURE: CPT

## 2022-11-18 PROCEDURE — 85610 PROTHROMBIN TIME: CPT

## 2022-11-18 RX ORDER — LISINOPRIL 10 MG/1
5 TABLET ORAL DAILY
Qty: 15 TABLET | Refills: 0 | Status: SHIPPED | OUTPATIENT
Start: 2022-11-19 | End: 2022-12-19

## 2022-11-18 RX ORDER — GABAPENTIN 100 MG/1
100 CAPSULE ORAL 3 TIMES DAILY
Qty: 90 CAPSULE | Refills: 0 | Status: SHIPPED | OUTPATIENT
Start: 2022-11-18 | End: 2022-12-18

## 2022-11-18 RX ORDER — LANOLIN ALCOHOL/MO/W.PET/CERES
100 CREAM (GRAM) TOPICAL 3 TIMES DAILY
Qty: 30 TABLET | Refills: 0 | Status: SHIPPED | OUTPATIENT
Start: 2022-11-18

## 2022-11-18 RX ORDER — METOPROLOL SUCCINATE 25 MG/1
25 TABLET, EXTENDED RELEASE ORAL DAILY
Qty: 30 TABLET | Refills: 0 | Status: SHIPPED | OUTPATIENT
Start: 2022-11-19

## 2022-11-18 RX ORDER — SPIRONOLACTONE 25 MG/1
12.5 TABLET ORAL DAILY
Qty: 30 TABLET | Refills: 0 | Status: SHIPPED | OUTPATIENT
Start: 2022-11-19

## 2022-11-18 RX ADMIN — Medication 100 MG: at 08:19

## 2022-11-18 RX ADMIN — CLOPIDOGREL BISULFATE 75 MG: 75 TABLET ORAL at 05:10

## 2022-11-18 RX ADMIN — GABAPENTIN 100 MG: 100 CAPSULE ORAL at 05:10

## 2022-11-18 RX ADMIN — FUROSEMIDE 20 MG: 20 TABLET ORAL at 05:10

## 2022-11-18 RX ADMIN — SPIRONOLACTONE 12.5 MG: 25 TABLET ORAL at 05:10

## 2022-11-18 RX ADMIN — TRAMADOL HYDROCHLORIDE 50 MG: 50 TABLET, COATED ORAL at 05:12

## 2022-11-18 RX ADMIN — APIXABAN 10 MG: 5 TABLET, FILM COATED ORAL at 05:09

## 2022-11-18 RX ADMIN — METOPROLOL SUCCINATE 25 MG: 25 TABLET, EXTENDED RELEASE ORAL at 05:09

## 2022-11-18 RX ADMIN — LISINOPRIL 5 MG: 5 TABLET ORAL at 05:10

## 2022-11-18 RX ADMIN — LEVOTHYROXINE SODIUM 125 MCG: 0.12 TABLET ORAL at 05:10

## 2022-11-18 RX ADMIN — GABAPENTIN 100 MG: 100 CAPSULE ORAL at 11:55

## 2022-11-18 ASSESSMENT — FIBROSIS 4 INDEX: FIB4 SCORE: 3.65

## 2022-11-18 NOTE — DISCHARGE INSTRUCTIONS
We have made an appointment for you in [place], [place] with Kristian Cook on 11/21/2022 at 0945. Please make sure you follow up eith this provider for heart failure care and possible referral to [place] for valvular disease.

## 2022-11-18 NOTE — DISCHARGE PLANNING
RNCM provided Cab Voucher to [employer]  Address: [address] [place] [address]    Eliquis Card trial card provided.    Received physical address of niece: [address] [place] [address].     Per MD, pt is seen at:  [facility]   Address: [address], [place] [address]  Phone: [phone]    Meds-2-beds approved Services:  Gabapentin $9.39  Lisinopril $7.71  Metoprolol $8.10  Spironolactone $8.34  Thiamine $8.22  Total: $41.76

## 2022-11-18 NOTE — PROGRESS NOTES
Discharge instructions given to patient at bedside, verbalizes understanding and states plans for follow-up with PCP and Cardiologist as recommended. Individualized instruction and CHF discharge information provided on Heart Failure, low sodium/fluid restricted diet, new and home medication review, post-discharge activity level, smoking/etoh cessation and worsening of symptoms needing follow-up care. Telemetry monitor/IV cathlon removed. All belongings accounted for, all questions answered at this time. Patient discharged to home in [place], [place] with  self.    Pt provided with cab voucher, train ticket to [place], and trial pack of Eliquis. Pt understands that he must see his primary in [place], of which our  Viviana arranged for him on 11/21/2022. Pt states he understands and is familiar with that facility and doctor. Pt taken to [employer] with voucher and all belongings.

## 2022-11-18 NOTE — DISCHARGE SUMMARY
[facility] Internal Medicine Discharge Summary    Attending: Andrea Anguiano M.d.  Senior Resident: Dr. Harrington  Intern:  Dr. Maloney  Contact Number: [phone]    CHIEF COMPLAINT ON ADMISSION  Chief Complaint   Patient presents with    Chest Pain     X 2 hours. Came in by ems. EMS gave 324 asa pta       Reason for Admission  NSTEMI (non-ST elevated myocardial infarction)    Admission Date  11/12/2022    CODE STATUS  Full Code    HPI & HOSPITAL COURSE  Mr. Kristopher Leroy is 47 yo male hx of Guillain-[place], hypothyroidism, valvular dz/CAD, hx of open heart surgery with bioprosthetic aortic valve on DAPT who presented on 11/12/2022 with chest pain. Patient lives in [place] and was traveling to [place] via train and stopped here due to chest pain, pressure that increased with movement and SOB, non-radiating. He takes aspirin, plavix, lasix for heart and valve disease. EKG with T inversion, troponin elevated 70s now down trended to steady in 50s. Repeat EKG throughout the day 11/13, no changes seen with concern for STEMI although chest pain consistently 9-10/10. Cardiology consulted, Dr. Roca evaluated patient and he underwent cath with results no concerns for ACS, recs for reevaluation of bioprosthetic aortic valve dysfunction. TTE with reduced EF 20%. CARLOS with concerns for bioprosthetic MV/AV dysfunction, left atrial appendage thrombus now on heparin gtt, and echogenic material on MV concern for endocarditis. Recommendation from cardiothoracic surgery and cardiology for f/u with [facility] for further management for valves, continue with plavix w/o aspirin and bridge heparin to warfarin. Endocarditis workup with procal negative and BCX NGTD. Anticoagulation switch to Apixaban. Patient was discharged with cardiology recommendation to follow-up with [facility] for further management. Cardiothoracic surgery did not recommend a 4th time redo of aortic and mitral valve replacements as the patient had EF 10-15% with  little improvement with dobutamine. They felt that he would not tolerate the procedure with a STS mortality risk of 10% with recommendation of medical management and following up with higher level of care.     Therefore, he is discharged in good and stable condition to home with close outpatient follow-up.    The patient met 2-midnight criteria for an inpatient stay at the time of discharge.    Discharge Date  11/18/2022    Physical Exam on Day of Discharge  Physical Exam  Constitutional:       General: He is not in acute distress.     Appearance: Normal appearance. Not ill appearing.      Comments: Comfortable in bed   HENT:      Head: Normocephalic and atraumatic.      Right Ear: External ear normal.      Left Ear: External ear normal.      Nose: Nose normal.      Mouth/Throat:      Mouth: Mucous membranes are moist.   Eyes:      General: No scleral icterus.        Right eye: No discharge.         Left eye: No discharge.      Extraocular Movements: Extraocular movements intact.      Conjunctiva/sclera: Conjunctivae normal.      Comments: Pupils equal   Cardiovascular:      Rate and Rhythm: Normal rate and regular rhythm.      Pulses: Normal pulses.      Heart sounds: Murmur heard.      Comments: Sternotomy scar, left upper chest scar from past pacemaker  Systolic murmur 3/6 aortic region  Pulmonary:      Effort: Pulmonary effort is normal. No respiratory distress.      Breath sounds: Normal breath sounds. No wheezing or rales.   Abdominal:      General: Abdomen is flat. Bowel sounds are normal. There is no distension.      Palpations: Abdomen is soft.   Musculoskeletal:         General: Deformity present. No swelling or tenderness. Normal range of motion.      Cervical back: Normal range of motion.      Right lower leg: No edema.      Left lower leg: No edema.      Comments: Bilateral hands fingers are contracted  Left toes removed   Skin:     General: Skin is warm and dry.      Coloration: Skin is not jaundiced.    Neurological:      General: No focal deficit present.      Mental Status: He is alert and oriented to person, place, and time.   Psychiatric:         Mood and Affect: Mood normal.         Behavior: Behavior normal.         Thought Content: Thought content normal.         Judgment: Judgment normal.     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) POA: Unknown    Elevated liver enzymes POA: Unknown    Dyslipidemia POA: Unknown    Chest pain POA: Unknown    Macrocytic anemia POA: Unknown    Alcohol consumption binge drinking POA: Unknown    Hypothyroidism POA: Unknown    History of aortic valve replacement with bioprosthetic valve POA: Yes  Resolved Problems:    Hyperthyroidism POA: Unknown      FOLLOW UP  Future Appointments   Date Time Provider Department Center   12/7/2022  2:30 PM [facility] None     Kristian Cook  [address] [address]  [phone]  Go on 11/21/2022  Go to your appointment with Kristian Cook on Monday November 21st 2022 at 9:45am      MEDICATIONS ON DISCHARGE     Medication List        START taking these medications        Instructions   apixaban 5mg Tabs  Start taking on: November 24, 2022  Commonly known as: ELIQUIS   Doctor's comments: Please continue taking apixaban 10 mg twice daily for the next 5 days before transitioning to 5 mg twice daily.  Take 2 tablets by mouth 2 times a day for the next 5 days, THEN take 1 Tablet by 2 times a day thereafter. Indications: DVT/PE  Dose: 5 mg     gabapentin 100 MG Caps  Commonly known as: NEURONTIN   Take 1 Capsule by mouth 3 times a day for 30 days.  Dose: 100 mg     spironolactone 25 MG Tabs  Start taking on: November 19, 2022  Commonly known as: ALDACTONE   [place] 0.5 tabletas por vía  oral diariamente  (Take 0.5 Tablets by mouth every day.)  Dose: 12.5 mg     thiamine 100 MG tablet  Commonly known as: THIAMINE   Take 1 Tablet by mouth in the morning, at noon, and at bedtime.  Dose: 100 mg            CHANGE how you take these medications        Instructions   lisinopril 10 MG Tabs  Start taking on: November 19, 2022  What changed:   medication strength  how much to take  Commonly known as: PRINIVIL   Take 0.5 Tablets by mouth every day for 30 days.  Dose: 5 mg     metoprolol SR 25 MG Tb24  Start taking on: November 19, 2022  What changed: how much to take  Commonly known as: TOPROL XL   [place] 1 tableta por vía oral diariamente.  (Take 1 Tablet by mouth every day.)  Dose: 25 mg            CONTINUE taking these medications        Instructions   aspirin 81 MG Chew chewable tablet  Commonly known as: ASA   Chew 1 Tablet every day.  Dose: 81 mg     atorvastatin 80 MG tablet  Commonly known as: LIPITOR   Take 1 Tablet by mouth every evening.  Dose: 80 mg     clopidogrel 75 MG Tabs  Commonly known as: PLAVIX   Take 1 Tablet by mouth every day.  Dose: 75 mg     furosemide 20 MG Tabs  Commonly known as: LASIX   Take 1 Tablet by mouth 2 times a day.  Dose: 20 mg     levothyroxine 125 MCG Tabs  Commonly known as: SYNTHROID   Take 1 Tablet by mouth every morning on an empty stomach.  Dose: 125 mcg              Allergies  Allergies   Allergen Reactions    Penicillins Unspecified     .       DIET  Orders Placed This Encounter   Procedures    Diet Order Diet: Cardiac     Standing Status:   Standing     Number of Occurrences:   1     Order Specific Question:   Diet:     Answer:   Cardiac [6]       ACTIVITY  As tolerated.  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   Bioprosthetic aortic  valve dysfunction with mixed at least moderate aortic regurgitation and stenosis  Normal caliber thoracic aorta  Bilateral iliofemoral stenting, historic  No evidence of acute coronary syndrome    LABORATORY  Lab Results   Component Value Date    SODIUM 136 11/16/2022    POTASSIUM 4.5 11/16/2022    CHLORIDE 99 11/16/2022    CO2 26 11/16/2022    GLUCOSE 111 (H) 11/16/2022    BUN 16 11/16/2022    CREATININE 0.70 11/16/2022        Lab Results   Component Value Date    WBC 3.5 (L) 11/16/2022    HEMOGLOBIN 12.9 (L) 11/16/2022    HEMATOCRIT 39.0 (L) 11/16/2022    PLATELETCT 88 (L) 11/16/2022        Total time of the discharge process exceeds 43 minutes.

## 2022-11-18 NOTE — PROGRESS NOTES
[facility] Internal Medicine Daily Progress Note    Date of Service  11/17/2022    [facility] Team: R IM Green Team   Attending: Dara Bridges M.d.  Senior Resident: Dr. Harrington  Intern:  Dr. Maloney  Contact Number: [phone]    Chief Complaint  Kristopher Leroy is a 46 y.o. male admitted 11/12/2022 with chest pain    ID  Mr. Kristopher Leroy is 47 yo male hx of Guillain-[place], hypothyroidism, valvular dz/CAD, hx of open heart surgery  with bioprosthetic aortic valve on DAPT who presented on 11/12/2022 with chest pain, cath results no evidence of ACS and further evaluation with CARLOS with concerns for bioprosthetic MV/AV dysfunction, left atrial appendage thrombus, and echogenic material on MV concern for endocarditis. Recommendation from cardiothoracic surgery and cardiology for f/u with [facility] for further management for valves, continue with plavix w/o aspirin and bridge heparin to warfarin. Endocarditis workup with procal negative and BCX NGTD.    Hospital Course  Mr. Kristopher Leroy is 47 yo male hx of Guillain-[place], hypothyroidism, valvular dz/CAD, hx of open heart surgery with bioprosthetic aortic valve on DAPT who presented on 11/12/2022 with chest pain. Patient live in [place] and was traveling in [place] via train and stopped here due to chest pain, pressure that increased with movement and SOB, non-radiating. Takes aspirin, plavix, lasix for hrt and valve dz. EKG with T inversion unsure if new, troponin elevated 70s now down trended to steady in 50s. Repeat EKG throughout the day 11/13, no changes seen with concern for STEMI although chest pain consistently 9-10/10. Cardiology consulted, Dr. Roca evaluated patient for cath lab today. Cath results no concerns for ACS, recs for reevaluation of bioprosthetic aortic valve dysfunction. TTE with reduced EF.  CARLOS with concerns for bioprosthetic MV/AV dysfunction, left atrial appendage thrombus now on heparin gtt, and echogenic material on MV concern for endocarditis.  Recommendation from cardiothoracic surgery and cardiology for f/u with [facility] for further management for valves, continue with plavix w/o aspirin and bridge heparin to warfarin. Endocarditis workup with procal negative and BCX NGTD.    Interval Problem Update  No adverse events overnight. This am, patient reports no chest pain, no shortness of breath. Confirmed loop recorder was placed last year.   Anticoagulation switch to Apixaban.   confirm [employer] ticket for 1410 and cab voucher at 1300.  Called his niece Marleen Elizabeth to confirm patient gets medical care at [facility] and her address is [address] [place] [address] but he does not live with her    I have discussed this patient's plan of care and discharge plan at IDT rounds today with Case Management, Nursing, Nursing leadership, and other members of the IDT team.    Consultants/Specialty  cardiology    Code Status  Full Code    Disposition  Patient is not medically cleared for discharge.   Anticipate discharge to to home with close outpatient follow-up.  I have placed the appropriate orders for post-discharge needs.    Review of Systems  Review of Systems   Constitutional:  Negative for chills, fever and malaise/fatigue.   Respiratory:  Negative for shortness of breath.    Cardiovascular:  Negative for chest pain and palpitations.   Gastrointestinal:  Negative for abdominal pain, blood in stool and nausea.   Genitourinary:  Negative for hematuria.   Musculoskeletal:  Negative for myalgias.   Neurological:  Negative for dizziness and headaches.      Physical Exam  Temp:  [36.5 °C (97.7 °F)-37.4 °C (99.3 °F)] 37.2 °C (99 °F)  Pulse:  [67-85] 67  Resp:  [16-18] 18  BP: (101-109)/(49-68) 108/65  SpO2:  [97 %-100 %] 98 %    Physical Exam  Constitutional:       General: He is not in acute distress.     Appearance: Normal appearance. He is ill-appearing.      Comments: Comfortable in bed   HENT:      Head: Normocephalic and atraumatic.       Right Ear: External ear normal.      Left Ear: External ear normal.      Nose: Nose normal.      Mouth/Throat:      Mouth: Mucous membranes are moist.   Eyes:      General: No scleral icterus.        Right eye: No discharge.         Left eye: No discharge.      Extraocular Movements: Extraocular movements intact.      Conjunctiva/sclera: Conjunctivae normal.      Comments: Pupils equal   Cardiovascular:      Rate and Rhythm: Normal rate and regular rhythm.      Pulses: Normal pulses.      Heart sounds: Murmur heard.      Comments: Sternotomy scar, left upper chest scar from past pacemaker  Systolic murmur 3/6 aortic region  Pulmonary:      Effort: Pulmonary effort is normal. No respiratory distress.      Breath sounds: Normal breath sounds. No wheezing or rales.   Abdominal:      General: Abdomen is flat. Bowel sounds are normal. There is no distension.      Palpations: Abdomen is soft.   Musculoskeletal:         General: Deformity present. No swelling or tenderness. Normal range of motion.      Cervical back: Normal range of motion.      Right lower leg: No edema.      Left lower leg: No edema.      Comments: Bilateral hands fingers are contracted  Left toes removed   Skin:     General: Skin is warm and dry.      Coloration: Skin is not jaundiced.   Neurological:      General: No focal deficit present.      Mental Status: He is alert and oriented to person, place, and time.   Psychiatric:         Mood and Affect: Mood normal.         Behavior: Behavior normal.         Thought Content: Thought content normal.         Judgment: Judgment normal.       Fluids    Intake/Output Summary (Last 24 hours) at 11/17/2022 1701  Last data filed at 11/17/2022 0112  Gross per 24 hour   Intake --   Output 500 ml   Net -500 ml       Laboratory  Recent Labs     11/15/22  0238 11/16/22  0342 11/16/22  1128   WBC 3.9* 3.2* 3.5*   RBC 3.26* 3.35* 3.71*   HEMOGLOBIN 11.5* 12.0* 12.9*   HEMATOCRIT 34.2* 35.7* 39.0*   .9* 106.6*  105.1*   MCH 35.3* 35.8* 34.8*   MCHC 33.6* 33.6* 33.1*   RDW 51.9* 52.8* 51.3*   PLATELETCT 88* 58* 88*   MPV 10.5 12.4 10.8     Recent Labs     11/15/22  0238 11/16/22  0342   SODIUM 139 136   POTASSIUM 4.0 4.5   CHLORIDE 104 99   CO2 26 26   GLUCOSE 108* 111*   BUN 19 16   CREATININE 0.67 0.70   CALCIUM 8.6 9.2     Recent Labs     11/16/22  1128 11/17/22  0140   INR 1.11 1.17*                 Imaging  EC-CARLOS W/O CONT   Final Result      EC-ECHOCARDIOGRAM COMPLETE W/O CONT   Final Result      CT-CTA CHEST PULMONARY ARTERY W/ RECONS   Final Result         1. No CT evidence of pulmonary embolism.      2. Mild hazy groundglass opacities throughout both lungs, right slightly more than left, likely mild pulmonary edema.      3. Patchy bibasilar atelectasis.         US-RUQ   Final Result      1. Status post cholecystectomy. The common bile duct is obscured by overlying bowel gas.   2. Homogeneous appearance of the liver with no hepatic mass.      DX-CHEST-PORTABLE (1 VIEW)   Final Result      1.  Mildly enlarged cardiac silhouette with vascular congestion.      CL-LEFT HEART CATHETERIZATION WITH POSSIBLE INTERVENTION    (Results Pending)        Assessment/Plan  Problem Representation:  Mr. Kristopher Leroy is 47 yo male hx of Guillain-[place], hypothyroidism, valvular dz/CAD, hx of open heart surgery with bioprosthetic aortic valve on DAPT who presented on 11/12/2022 with chest pain, cath results no evidence of ACS and further evaluation with CARLOS with concerns for bioprosthetic MV/AV dysfunction, left atrial appendage thrombus, and echogenic material on MV concern for endocarditis. Recommendation from cardiothoracic surgery and cardiology for f/u with [facility] for further management for valves, continue with plavix w/o aspirin. Endocarditis workup with procal negative and BCX NGTD. Anticoagulation switch from warfarin to apixaban. Plan for discharge tomorrow.    * NSTEMI (non-ST elevated myocardial infarction) (HCC)-  (present on admission)  Assessment & Plan  Ruled out by Cath results no ACS      Chest pain  Assessment & Plan  RESOLVED  Per patient, no chest pain   History of aortic bioprosthetic valve replacement on DAPT, per patient replaced three times   Hx of pacemaker placement with infection requiring removal, has a loop recorder implanted  EKG showed T inversion, no prior records to compare if new. CARLOS EF 20% with concerns for bioprosthetic MV/AV dysfunction, left atrial appendage thrombus, and echogenic material on MV concern for endocarditis.  -Cardiology following, apprec recs, CTS was consulted for valvular dysfunction: Recommendation for f/u with [facility] for further management for valves, continue with plavix w/o aspirin, anticoagulation with apixaban. Endocarditis workup with procal negative and BCX NGTD.  -continue plavix 75mg  -metoprolol SR 25mg  -lisinopril 5mg  -atorvastatin 80mg  -lasix 20mg BID  -Apixaban      Elevated liver enzymes  Assessment & Plan  Elevated ALT more than AST, abdominal exam no tenderness.   Does have history of alcohol binging, last drink binge >5 drinks 2 weeks ago, last drink of 1 beer 2 days ago  US RUQ - resected gallbladder, liver homogenous no mass  -Continue to monitor LFTs while on statin  -Tylenol prn dose limit <2g    Thrombocytopenia (HCC)  Assessment & Plan  Platelets downtrended 88 to 58. Repeat CBC showed platelets at 88.   Low suspicion for HIT.   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 In review of yesterday's note (11/16/2022), there are no changes except as documented above.

## 2022-11-18 NOTE — CARE PLAN
The patient is Stable - Low risk of patient condition declining or worsening    Shift Goals  Clinical Goals: monitor vitals , discharge , pain management  Patient Goals: rest  Family Goals: n/a    Progress made toward(s) clinical / shift goals:  VSS , pain managed , DC arrangements for tomorrow     Patient is not progressing towards the following goals:

## 2022-11-18 NOTE — CARE PLAN
The patient is Stable - Low risk of patient condition declining or worsening    Shift Goals  Clinical Goals: dc home , ambulate halls  Patient Goals: home  Family Goals: n/a    Progress made toward(s) clinical / shift goals:  home    Patient is not progressing towards the following goals:

## 2022-11-18 NOTE — FACE TO FACE
Face to Face Note  -  Durable Medical Equipment    Derik Harrington M.D. 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    My Clinical findings support the need for the above equipment due to:  Abnormal Gait

## 2022-11-19 LAB
BACTERIA BLD CULT: NORMAL
BACTERIA BLD CULT: NORMAL
SIGNIFICANT IND 70042: NORMAL
SIGNIFICANT IND 70042: NORMAL
SITE SITE: NORMAL
SITE SITE: NORMAL
SOURCE SOURCE: NORMAL
SOURCE SOURCE: NORMAL

## 2022-12-02 ENCOUNTER — TELEPHONE (OUTPATIENT)
Dept: CARDIOLOGY | Facility: MEDICAL CENTER | Age: 47
End: 2022-12-02

## 2022-12-02 NOTE — TELEPHONE ENCOUNTER
Spoke to patient in regards to obtaining records for NP appointment with Candice Mcfarland. Spoke to Kristopher's relative Marleen.   Confirmed all recent notes, labs, and cardiac imaging are in Epic. Confirmed with patient appointment time, location, and date.

## 2023-01-05 NOTE — DOCUMENTATION QUERY
"                                                                         [facility]                                                                       Query Response Note      PATIENT:               KATTY MARCUS  ACCT #:                  [id number]  MRN:                     [id number]  :                      1975  ADMIT DATE:       2022 3:51 PM  DISCH DATE:        2022 1:35 PM  RESPONDING  PROVIDER #:        [id number]           QUERY TEXT:    Non-ST elevation myocardial infarction (NSTEMI) is documented in the medical. Please clarify:    NOTE:  If an appropriate response is not listed below, please respond with a new note.                      The patient's Clinical Indicators include:  Patient presents with chest pain and elevated troponin 70s. NSTEMI is documented in the H&P and discharge summary. However, there is conflicting documentation among the progress notes.    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